# Patient Record
Sex: MALE | Race: BLACK OR AFRICAN AMERICAN
[De-identification: names, ages, dates, MRNs, and addresses within clinical notes are randomized per-mention and may not be internally consistent; named-entity substitution may affect disease eponyms.]

---

## 2017-04-06 ENCOUNTER — HOSPITAL ENCOUNTER (INPATIENT)
Dept: HOSPITAL 62 - ER | Age: 67
LOS: 5 days | Discharge: HOME | DRG: 101 | End: 2017-04-11
Attending: INTERNAL MEDICINE | Admitting: INTERNAL MEDICINE
Payer: MEDICARE

## 2017-04-06 DIAGNOSIS — F17.210: ICD-10-CM

## 2017-04-06 DIAGNOSIS — I10: ICD-10-CM

## 2017-04-06 DIAGNOSIS — E86.0: ICD-10-CM

## 2017-04-06 DIAGNOSIS — N17.9: ICD-10-CM

## 2017-04-06 DIAGNOSIS — E87.6: ICD-10-CM

## 2017-04-06 DIAGNOSIS — G40.409: Primary | ICD-10-CM

## 2017-04-06 DIAGNOSIS — R11.2: ICD-10-CM

## 2017-04-06 DIAGNOSIS — R06.6: ICD-10-CM

## 2017-04-06 DIAGNOSIS — E87.1: ICD-10-CM

## 2017-04-06 DIAGNOSIS — Z79.82: ICD-10-CM

## 2017-04-06 DIAGNOSIS — E78.00: ICD-10-CM

## 2017-04-06 DIAGNOSIS — Z79.899: ICD-10-CM

## 2017-04-06 LAB
ALBUMIN SERPL-MCNC: 4.3 G/DL (ref 3.5–5)
ALP SERPL-CCNC: 146 U/L (ref 38–126)
ALT SERPL-CCNC: 28 U/L (ref 21–72)
ANION GAP SERPL CALC-SCNC: 19 MMOL/L (ref 5–19)
APPEARANCE UR: CLEAR
AST SERPL-CCNC: 51 U/L (ref 17–59)
BASOPHILS # BLD AUTO: 0 10^3/UL (ref 0–0.2)
BASOPHILS NFR BLD AUTO: 0.2 % (ref 0–2)
BILIRUB DIRECT SERPL-MCNC: 0.2 MG/DL (ref 0–0.4)
BILIRUB SERPL-MCNC: 0.8 MG/DL (ref 0.2–1.3)
BILIRUB UR QL STRIP: NEGATIVE
BUN SERPL-MCNC: 38 MG/DL (ref 7–20)
CALCIUM: 8.5 MG/DL (ref 8.4–10.2)
CHLORIDE SERPL-SCNC: 75 MMOL/L (ref 98–107)
CO2 SERPL-SCNC: 22 MMOL/L (ref 22–30)
CREAT SERPL-MCNC: 2.63 MG/DL (ref 0.52–1.25)
EOSINOPHIL # BLD AUTO: 0 10^3/UL (ref 0–0.6)
EOSINOPHIL NFR BLD AUTO: 0 % (ref 0–6)
ERYTHROCYTE [DISTWIDTH] IN BLOOD BY AUTOMATED COUNT: 13.6 % (ref 11.5–14)
GLUCOSE SERPL-MCNC: 97 MG/DL (ref 75–110)
GLUCOSE UR STRIP-MCNC: NEGATIVE MG/DL
HCT VFR BLD CALC: 38.6 % (ref 37.9–51)
HGB BLD-MCNC: 12.8 G/DL (ref 13.5–17)
HGB HCT DIFFERENCE: -0.2
KETONES UR STRIP-MCNC: (no result) MG/DL
LYMPHOCYTES # BLD AUTO: 1.5 10^3/UL (ref 0.5–4.7)
LYMPHOCYTES NFR BLD AUTO: 9.9 % (ref 13–45)
MCH RBC QN AUTO: 27.6 PG (ref 27–33.4)
MCHC RBC AUTO-ENTMCNC: 33.1 G/DL (ref 32–36)
MCV RBC AUTO: 83 FL (ref 80–97)
MONOCYTES # BLD AUTO: 0.9 10^3/UL (ref 0.1–1.4)
MONOCYTES NFR BLD AUTO: 5.9 % (ref 3–13)
NEUTROPHILS # BLD AUTO: 12.5 10^3/UL (ref 1.7–8.2)
NEUTS SEG NFR BLD AUTO: 84 % (ref 42–78)
NITRITE UR QL STRIP: NEGATIVE
PH UR STRIP: 6 [PH] (ref 5–9)
POTASSIUM SERPL-SCNC: 3.1 MMOL/L (ref 3.6–5)
PROT SERPL-MCNC: 8 G/DL (ref 6.3–8.2)
PROT UR STRIP-MCNC: NEGATIVE MG/DL
RBC # BLD AUTO: 4.63 10^6/UL (ref 4.35–5.55)
SODIUM SERPL-SCNC: 117.7 MMOL/L (ref 137–145)
SP GR UR STRIP: 1
UROBILINOGEN UR-MCNC: NEGATIVE MG/DL (ref ?–2)
WBC # BLD AUTO: 14.9 10^3/UL (ref 4–10.5)

## 2017-04-06 PROCEDURE — 80184 ASSAY OF PHENOBARBITAL: CPT

## 2017-04-06 PROCEDURE — 81001 URINALYSIS AUTO W/SCOPE: CPT

## 2017-04-06 PROCEDURE — 80048 BASIC METABOLIC PNL TOTAL CA: CPT

## 2017-04-06 PROCEDURE — 96372 THER/PROPH/DIAG INJ SC/IM: CPT

## 2017-04-06 PROCEDURE — 83735 ASSAY OF MAGNESIUM: CPT

## 2017-04-06 PROCEDURE — 80185 ASSAY OF PHENYTOIN TOTAL: CPT

## 2017-04-06 PROCEDURE — 36415 COLL VENOUS BLD VENIPUNCTURE: CPT

## 2017-04-06 PROCEDURE — 71020: CPT

## 2017-04-06 PROCEDURE — 85025 COMPLETE CBC W/AUTO DIFF WBC: CPT

## 2017-04-06 PROCEDURE — 99291 CRITICAL CARE FIRST HOUR: CPT

## 2017-04-06 PROCEDURE — 96374 THER/PROPH/DIAG INJ IV PUSH: CPT

## 2017-04-06 PROCEDURE — 80053 COMPREHEN METABOLIC PANEL: CPT

## 2017-04-06 RX ADMIN — EXTENDED PHENYTOIN SODIUM SCH MG: 100 CAPSULE ORAL at 22:40

## 2017-04-06 RX ADMIN — SODIUM CHLORIDE PRN ML: 9 INJECTION, SOLUTION INTRAVENOUS at 19:50

## 2017-04-06 RX ADMIN — SIMVASTATIN SCH MG: 10 TABLET, FILM COATED ORAL at 22:40

## 2017-04-06 RX ADMIN — PHENOBARBITAL SCH MG: 32.4 TABLET ORAL at 19:49

## 2017-04-06 RX ADMIN — GABAPENTIN SCH MG: 300 CAPSULE ORAL at 22:40

## 2017-04-06 NOTE — PDOC H&P
History of Present Illness


Admission Date/PCP: 


  17 10:44





  RAPHAEL FREDIHERMINIOGIOAVNI





Patient complains of: Seizure, vomiting


History of Present Illness: 


ANGY GILBERT is a 66 year old male known to my practice who was brought 

to the ED by medic due spousal reported episode of seizure activity at home. It 

was reported as tonic-clonic type that lasted about 2 minutes and recurrent at 

home before his wife alerted EMS. He had another episode while been evaluated 

in the ED. Spouse reported episodes of nausea and vomiting for the past 3 days. 

She denied any recent alcohol usage or ingestion by the patient. Due to his 

nausea and vomiting maintenance on his anti seizure medication have been 

difficult. Spouse reported complain about abdominal pain and persistent 

hiccups. She denied associated diarrhea, fever, or chills. No reported chest 

pain, or difficulty with breathing. There rattling cough without significant 

sputum production. At the time of my evaluation patient has had IV Lorazepam, 

oral Dilantin and Phenobarbital administration, following command but not full 

at his preadmission state.





Past Medical History


Cardiac Medical History: Reports: Hyperlipidema, Hypertension


Neurological Medical History: Reports: Seizures


Psychiatric Medical History: 


   Denies: Depression





Social History


Smoking Status: Former Smoker


Number of Years Smokin


Last Time Smoked: 4 yrs ago


Frequency of Alcohol Use: None


Hx Recreational Drug Use: No


Drugs: None


Hx Prescription Drug Abuse: No





- Advance Directive


Resuscitation Status: Full Code





Family History


Family History: Reviewed & Not Pertinent


Parental Family History Reviewed: Yes


Children Family History Reviewed: Yes


Sibling(s) Family History Reviewed.: Yes





Medication/Allergy


Home Medications: 








Aspirin [Ecotrin 81 mg EC Tablet] 81 mg PO DAILY 17 


Gabapentin [Neurontin 300 mg Capsule] 300 mg PO Q8 17 


Lisinopril [Prinivil 10 mg Tablet] 10 mg PO DAILY 17 


Phenobarbital [Phenobarbital 32.4 Mg Tablet] 32.4 mg PO BID 17 


Phenytoin Sodium Extended [Dilantin 100 mg Capsule.er] 100 mg PO QAM 17 


Phenytoin Sodium Extended [Dilantin 100 mg Capsule.er] 100 mg PO QHS 17 


Phenytoin Sodium Extended [Dilantin 100 mg Capsule.er] 200 mg PO DAILY@1400  


Simvastatin [Zocor 10 mg Tablet] 10 mg PO QHS 17 


Simvastatin [Zocor 20 mg Tablet] 20 mg PO DAILY 17 








Allergies/Adverse Reactions: 


 





No Known Allergies Allergy (Verified 10/14/14 09:35)


 











Review of Systems


Constitutional: ABSENT: chills, fever(s), headache(s), weight gain, weight loss


Eyes: ABSENT: visual disturbances


Ears: ABSENT: hearing changes


Nose, Mouth, and Throat: ABSENT: as per HPI, headache(s), mouth pain, sore 

throat, vertigo, other


Cardiovascular: ABSENT: chest pain, dyspnea on exertion, edema, orthropnea, 

palpitations


Respiratory: ABSENT: cough, hemoptysis


Gastrointestinal: PRESENT: abdominal pain, nausea, vomiting.  ABSENT: as per HPI

, bloating, coffee ground emesis, constipation, diarrhea, dysphagia, heartburn, 

hematemesis, hematochezia, melena, other


Genitourinary: ABSENT: dysuria, hematuria


Musculoskeletal: ABSENT: joint swelling


Integumentary: ABSENT: rash, wounds


Neurological: PRESENT: confusion - post ictal, convulsions


Psychiatric: ABSENT: anxiety, depression, homidical ideation, suicidal ideation


Endocrine: ABSENT: cold intolerance, heat intolerance, polydipsia, polyuria


Hematologic/Lymphatic: ABSENT: easy bleeding, easy bruising, lymphadenopathy


Allergic/Immunologic: PRESENT: seasonal rhinorrhea





Physical Exam


Vital Signs: 


 











Temp Pulse Resp BP Pulse Ox


 


 98.5 F   85   19   170/99 H  98 


 


 17 16:52  17 16:52  17 16:52  17 16:52  17 17:51








 Intake & Output











 17





 06:59 06:59 06:59


 


Weight   77.8 kg











General appearance: PRESENT: no acute distress, cooperative


Head exam: PRESENT: atraumatic, normocephalic


Eye exam: PRESENT: conjunctiva pink, EOMI, PERRLA.  ABSENT: scleral icterus


Ear exam: PRESENT: normal external ear exam


Mouth exam: PRESENT: moist, tongue midline


Teeth exam: ABSENT: dental caries, dental tenderness, edentulous, poor dentation

, other


Throat exam: ABSENT: post pharyngeal erythema, tonsillar erythema, tonsillar 

exudate, tonsillogmegaly, other


Neck exam: PRESENT: full ROM.  ABSENT: carotid bruit, JVD, lymphadenopathy, 

thyromegaly


Respiratory exam: PRESENT: clear to auscultation sanrdo, crackles - scattered 

bilaterally, decreased breath sounds


Cardiovascular exam: PRESENT: RRR.  ABSENT: diastolic murmur, rubs, systolic 

murmur


Pulses: PRESENT: normal dorsalis pedis pul, +2 pedal pulses bilateral


Vascular exam: PRESENT: normal capillary refill


GI/Abdominal exam: PRESENT: normal bowel sounds, soft.  ABSENT: distended, 

guarding, mass, organolmegaly, rebound, tenderness


Rectal exam: PRESENT: deferred


Extremities exam: PRESENT: full ROM


Musculoskeletal exam: PRESENT: deformity - related to joint involvement with 

arthritis


Neurological exam: PRESENT: alert, awake, oriented to person, reflexes normal, 

CN II-XII grossly intact.  ABSENT: altered, oriented to place, oriented to time

, oriented to situation, abnormal gait, ataxia, motor sensory deficit, normal 

gait, aphasic, other


Psychiatric exam: PRESENT: appropriate affect, normal mood.  ABSENT: homicidal 

ideation, suicidal ideation


Skin exam: PRESENT: dry, intact, warm.  ABSENT: cyanosis, rash





Results


Laboratory Results: 


See [employer] for laboratory information. These were reviewed and form 

significant part of my medial decision making.





Assessment & Plan





- Diagnosis


(1) Dehydration with hyponatremia


Is this a current diagnosis for this admission?: YesPlan: 


See admitting physician orders.








(2) Intractable nausea and vomiting


Qualifiers: 


     Vomiting type: unspecified     Qualified Code(s): R11.2 - Nausea with 

vomiting, unspecified  


Is this a current diagnosis for this admission?: YesPlan: 


See admitting physician orders.











(4) Intractable hiccups


Is this a current diagnosis for this admission?: YesPlan: 


See admitting physician orders.








(5) HLD (hyperlipidemia)


Qualifiers: 


     Hyperlipidemia type: pure hypercholesterolemia        Qualified Code(s): 

E78.00 - Pure hypercholesterolemia, unspecified; E78.0 - Pure 

hypercholesterolemia  


Is this a current diagnosis for this admission?: YesPlan: 


See admitting physician orders.








(6) HTN (hypertension)


Qualifiers: 


     Hypertension type: essential hypertension        Qualified Code(s): I10 - 

Essential (primary) hypertension  


Is this a current diagnosis for this admission?: YesPlan: 


See admitting physician orders.








(7) Hypopotassemia


Is this a current diagnosis for this admission?: YesPlan: 


See admitting physician orders.











- Time


Time Spent: 50 to 70 Minutes


Medications reviewed and adjusted accordingly: Yes


Anticipated discharge: Home


Within: Other





- Inpatient Certification


Medical Necessity: Need Close Monitoring Due to Risk of Patient Decompensation, 

Need For IV Fluids, Need For Continuous Telemetry Monitoring, Risk of 

Complication if Not Cared For in Hospital


Post Hospital Care: D/C Planner Documentation





- Plan Summary


Plan Summary: 


See admitting physician orders.

## 2017-04-06 NOTE — ER DOCUMENT REPORT
ED General





- General


Stated Complaint: POSSIBLE SEIZURE


Mode of Arrival: Medic


Information source: Patient


Notes: 


66-year-old male with history of seizure disorder after a tumor was removed was 

on Dilantin and phenobarbital presents with EMS with concerns of seizure.  It 

is noted that the patient has been vomiting for past 2 days has not been 

holding his medications down.  Wife denies any other complaints denies any 

fevers or chills denies any abdominal pain.  Patient did have a 2 minute 

seizure at home, on arrival here patient did have a another  focal seizure eyes 

were to the left, unresponsive


TRAVEL OUTSIDE OF THE [place] IN LAST 30 DAYS: No





- HPI


Onset: Just prior to arrival


Onset/Duration: Sudden


Quality of pain: No pain


Severity: Mild


Pain Level: Denies


Associated symptoms: Nausea, Vomiting, Other


Exacerbated by: Denies


Relieved by: Denies


Similar symptoms previously: Yes


Recently seen / treated by doctor: Yes





- Related Data


Allergies/Adverse Reactions: 


 





No Known Allergies Allergy (Verified 10/14/14 09:35)


 











Past Medical History





- Social History


Smoking Status: Never Smoker


Cigarette use (# per day): No


Chew tobacco use (# tins/day): No


Smoking Education Provided: No


Family History: Reviewed & Not Pertinent





- Past Medical History


Cardiac Medical History: Reports: Hx Hypercholesterolemia, Hx Hypertension


Neurological Medical History: Reports: Hx Seizures


Psychiatric Medical History: 


   Denies: Hx Depression


Past Surgical History: Reports: Hx Neurologic Surgery - Brain tumor removal





- Immunizations


Hx Diphtheria, Pertussis, Tetanus Vaccination: Yes


Hx Pneumococcal Vaccination: 10/16/14





Review of Systems





- Review of Systems


Notes: 


REVIEW OF SYSTEMS:


CONSTITUTIONAL :  Denies fever,  chills, or sweats.  Denies recent illness.


EENT:   Denies eye, ear, throat, or mouth pain or symptoms.  Denies nasal or 

sinus congestion or discharge.  Denies throat, tongue, or mouth swelling or 

difficulty swallowing.


CARDIOVASCULAR:  Denies chest pain.  Denies palpitations or racing or irregular 

heart beat.  Denies ankle edema.


RESPIRATORY:  Denies cough, cold, or chest congestion.  Denies shortness of 

breath, difficulty breathing, or wheezing.


GASTROINTESTINAL: Nausea vomiting


GENITOURINARY:  Denies difficulty urinating, painful urination, burning, 

frequency, blood in urine, or discharge.


MUSCULOSKELETAL:  Denies back or neck pain or stiffness.  Denies joint pain or 

swelling.


SKIN:   Denies rash, lesions or sores.


HEMATOLOGIC :   Denies easy bruising or bleeding.


LYMPHATIC:  Denies swollen, enlarged glands.


NEUROLOGICAL:  Denies confusion or altered mental status.  Denies passing out 

or loss of consciousness.  Denies dizziness or lightheadedness.  Denies 

headache.  Denies weakness or paralysis or loss of use of either side.  Denies 

problems with gait or speech.  Denies sensory loss, numbness, or tingling.  

Denies seizures.


PSYCHIATRIC:  Denies anxiety or stress.  Denies depression, suicidal ideation, 

or homicidal ideation.





ALL OTHER SYSTEMS REVIEWED AND NEGATIVE.





Dictation was performed using Dragon voice recognition software 








PHYSICAL EXAMINATION:





GENERAL: Well-appearing, well-nourished and in no acute distress.





HEAD: Atraumatic, normocephalic.





EYES: Pupils equal round and reactive to light, extraocular movements intact, 

sclera anicteric, conjunctiva are normal.





ENT: Nares patent, oropharynx clear without exudates.  Moist mucous membranes.





NECK: Normal range of motion, supple without lymphadenopathy





LUNGS: Breath sounds clear to auscultation bilaterally and equal.  No wheezes 

rales or rhonchi.





HEART: Regular rate and rhythm without murmurs





ABDOMEN: Soft, nontender, nondistended abdomen.  No guarding, no rebound.  No 

masses appreciated.





Musculoskeletal: Normal range of motion, no pitting or edema.  No cyanosis.





NEUROLOGICAL: Patient seizing, resolved with Ativan.  Patient postictal





PSYCH: Normal mood, normal affect.





SKIN: Warm, Dry, normal turgor, no rashes or lesions noted.





Course





- Re-evaluation


Re-evalutation: 


04/06/17 08:48


I believe the patient's seizure disorder has been worsened with his vomiting 

and inability to keep down his medications at this time.  He has labs ordered 

at this time





04/06/17 10:28


it appears patient is quite hyponatremic, in acute renal fialure. IV fluids 

ordered, will admit ot his pcp 





04/06/17 10:31


Spelled with Dr. Zacarias who will accept to the [facility], defers on 3% at this 

time 





- Laboratory


Result Diagrams: 


 04/06/17 09:37





 04/06/17 09:37


Laboratory results interpreted by me: 


 











  04/06/17 04/06/17 04/06/17





  09:37 09:37 09:37


 


WBC  14.9 H  


 


Hgb  12.8 L  


 


Seg Neutrophils %  84.0 H  


 


Lymphocytes %  9.9 L  


 


Absolute Neutrophils  12.5 H  


 


Sodium   117.7 L* 


 


Potassium   3.1 L 


 


Chloride   75 L 


 


BUN   38 H 


 


Creatinine   2.63 H 


 


Est GFR ( Amer)   30 L 


 


Est GFR (Non-Af Amer)   24 L 


 


Alkaline Phosphatase   146 H 


 


Phenytoin    5.5 L














Critical Care Note





- Critical Care Note


Total time excluding time spent on procedures (mins): 38


Comments: 


38  minutes of critical care time spent in direct contact evaluating and 

reevaluating the patient, treating symptoms, reviewing labs and studies and 

speaking with family  and consultants excluding any procedures





Discharge





- Discharge


Clinical Impression: 


 Dehydration with hyponatremia, Renal insufficiency, Seizure





Intractable nausea and vomiting


Qualifiers:


 Vomiting type: unspecified Qualified Code(s): R11.2 - Nausea with vomiting, 

unspecified





Admitting Provider: Adi


Unit Admitted: [facility]MC[facility]


Referrals: 


RAPHAEL ZACARIAS MD [Primary Care Provider] - Follow up as needed

## 2017-04-07 LAB
ALBUMIN SERPL-MCNC: 4.5 G/DL (ref 3.5–5)
ALP SERPL-CCNC: 153 U/L (ref 38–126)
ALT SERPL-CCNC: 31 U/L (ref 21–72)
ANION GAP SERPL CALC-SCNC: 14 MMOL/L (ref 5–19)
AST SERPL-CCNC: 52 U/L (ref 17–59)
BASOPHILS # BLD AUTO: 0 10^3/UL (ref 0–0.2)
BASOPHILS NFR BLD AUTO: 0.1 % (ref 0–2)
BILIRUB DIRECT SERPL-MCNC: 0.4 MG/DL (ref 0–0.4)
BILIRUB SERPL-MCNC: 1.2 MG/DL (ref 0.2–1.3)
BUN SERPL-MCNC: 27 MG/DL (ref 7–20)
CALCIUM: 9.1 MG/DL (ref 8.4–10.2)
CHLORIDE SERPL-SCNC: 91 MMOL/L (ref 98–107)
CO2 SERPL-SCNC: 29 MMOL/L (ref 22–30)
CREAT SERPL-MCNC: 1.45 MG/DL (ref 0.52–1.25)
EOSINOPHIL # BLD AUTO: 0 10^3/UL (ref 0–0.6)
EOSINOPHIL NFR BLD AUTO: 0 % (ref 0–6)
ERYTHROCYTE [DISTWIDTH] IN BLOOD BY AUTOMATED COUNT: 13.8 % (ref 11.5–14)
GLUCOSE SERPL-MCNC: 102 MG/DL (ref 75–110)
HCT VFR BLD CALC: 44 % (ref 37.9–51)
HGB BLD-MCNC: 14.2 G/DL (ref 13.5–17)
HGB HCT DIFFERENCE: -1.4
LYMPHOCYTES # BLD AUTO: 1.6 10^3/UL (ref 0.5–4.7)
LYMPHOCYTES NFR BLD AUTO: 22 % (ref 13–45)
MCH RBC QN AUTO: 27.3 PG (ref 27–33.4)
MCHC RBC AUTO-ENTMCNC: 32.2 G/DL (ref 32–36)
MCV RBC AUTO: 85 FL (ref 80–97)
MONOCYTES # BLD AUTO: 0.8 10^3/UL (ref 0.1–1.4)
MONOCYTES NFR BLD AUTO: 10.1 % (ref 3–13)
NEUTROPHILS # BLD AUTO: 5 10^3/UL (ref 1.7–8.2)
NEUTS SEG NFR BLD AUTO: 67.8 % (ref 42–78)
POTASSIUM SERPL-SCNC: 3.1 MMOL/L (ref 3.6–5)
PROT SERPL-MCNC: 8.8 G/DL (ref 6.3–8.2)
RBC # BLD AUTO: 5.18 10^6/UL (ref 4.35–5.55)
SODIUM SERPL-SCNC: 133.8 MMOL/L (ref 137–145)
WBC # BLD AUTO: 7.5 10^3/UL (ref 4–10.5)

## 2017-04-07 RX ADMIN — EXTENDED PHENYTOIN SODIUM SCH MG: 100 CAPSULE ORAL at 21:38

## 2017-04-07 RX ADMIN — EXTENDED PHENYTOIN SODIUM SCH MG: 100 CAPSULE ORAL at 13:06

## 2017-04-07 RX ADMIN — POTASSIUM CHLORIDE SCH MEQ: 750 TABLET, FILM COATED, EXTENDED RELEASE ORAL at 11:09

## 2017-04-07 RX ADMIN — PHENOBARBITAL SCH MG: 32.4 TABLET ORAL at 19:01

## 2017-04-07 RX ADMIN — GABAPENTIN SCH MG: 300 CAPSULE ORAL at 13:06

## 2017-04-07 RX ADMIN — POTASSIUM CHLORIDE SCH MEQ: 750 TABLET, FILM COATED, EXTENDED RELEASE ORAL at 13:05

## 2017-04-07 RX ADMIN — GABAPENTIN SCH MG: 300 CAPSULE ORAL at 21:38

## 2017-04-07 RX ADMIN — SODIUM CHLORIDE PRN ML: 9 INJECTION, SOLUTION INTRAVENOUS at 05:21

## 2017-04-07 RX ADMIN — SIMVASTATIN SCH MG: 10 TABLET, FILM COATED ORAL at 21:38

## 2017-04-07 RX ADMIN — GABAPENTIN SCH MG: 300 CAPSULE ORAL at 05:21

## 2017-04-07 RX ADMIN — PHENOBARBITAL SCH MG: 32.4 TABLET ORAL at 09:16

## 2017-04-07 RX ADMIN — LISINOPRIL SCH MG: 10 TABLET ORAL at 09:16

## 2017-04-07 RX ADMIN — LANSOPRAZOLE SCH MG: 30 TABLET, ORALLY DISINTEGRATING, DELAYED RELEASE ORAL at 05:21

## 2017-04-07 RX ADMIN — SODIUM CHLORIDE PRN ML: 9 INJECTION, SOLUTION INTRAVENOUS at 15:24

## 2017-04-07 RX ADMIN — EXTENDED PHENYTOIN SODIUM SCH MG: 100 CAPSULE ORAL at 09:17

## 2017-04-07 RX ADMIN — ENOXAPARIN SODIUM SCH MG: 40 INJECTION SUBCUTANEOUS at 09:16

## 2017-04-07 NOTE — PDOC PROGRESS REPORT
Subjective


Progress Note for:: 04/07/17


Subjective:: 


Patient is more lucid and able to contribute to his medical history at the time 

of my assessment this afternoon but assumed admission due to his persistent 

hiccups. Denied any nausea or vomiting. No abdominal pain. His hiccups do 

persist. No chest pain or difficulty with breathing.





Physical Exam


Vital Signs: 


 











Temp Pulse Resp BP Pulse Ox


 


 99.8 F   83   27 H  151/73 H  98 


 


 04/07/17 11:46  04/07/17 14:00  04/07/17 11:46  04/07/17 11:46  04/07/17 11:46








 Intake & Output











 04/06/17 04/07/17 04/08/17





 06:59 06:59 06:59


 


Intake Total  1288 710


 


Output Total  800 1000


 


Balance  488 -290











General appearance: PRESENT: no acute distress, cooperative


Head exam: PRESENT: atraumatic, normocephalic


Eye exam: PRESENT: conjunctiva pink, EOMI, PERRLA.  ABSENT: scleral icterus


Mouth exam: PRESENT: moist


Respiratory exam: PRESENT: clear to auscultation sandro


Cardiovascular exam: PRESENT: RRR.  ABSENT: diastolic murmur, rubs, systolic 

murmur


GI/Abdominal exam: PRESENT: normal bowel sounds, soft.  ABSENT: distended, 

guarding, mass, organolmegaly, rebound, tenderness


Extremities exam: PRESENT: full ROM


Musculoskeletal exam: PRESENT: deformity - related to joint involvement with 

arthritis


Neurological exam: PRESENT: alert, awake, oriented to person, oriented to place

, oriented to time, oriented to situation, CN II-XII grossly intact, other - 

there is persistent hiccups throughout the duration of my visit..  ABSENT: 

motor sensory deficit


Psychiatric exam: PRESENT: appropriate affect, normal mood.  ABSENT: homicidal 

ideation, suicidal ideation


Skin exam: PRESENT: dry, intact, warm.  ABSENT: cyanosis, rash





Results


Laboratory Results: 


 





 04/07/17 04:51 





 04/07/17 04:51 





 











  04/06/17 04/07/17 04/07/17





  23:15 04:51 04:51


 


WBC   7.5 


 


RBC   5.18 


 


Hgb   14.2 


 


Hct   44.0 


 


MCV   85 


 


MCH   27.3 


 


MCHC   32.2 


 


RDW   13.8 


 


Plt Count   216 


 


Seg Neutrophils %   67.8 


 


Lymphocytes %   22.0 


 


Monocytes %   10.1 


 


Eosinophils %   0.0 


 


Basophils %   0.1 


 


Absolute Neutrophils   5.0 


 


Absolute Lymphocytes   1.6 


 


Absolute Monocytes   0.8 


 


Absolute Eosinophils   0.0 


 


Absolute Basophils   0.0 


 


Sodium    133.8 L


 


Potassium    3.1 L


 


Chloride    91 L


 


Carbon Dioxide    29


 


Anion Gap    14


 


BUN    27 H


 


Creatinine    1.45 H


 


Est GFR ( Amer)    59 L


 


Est GFR (Non-Af Amer)    49 L


 


Glucose    102


 


Calcium    9.1


 


Magnesium   


 


Total Bilirubin    1.2


 


AST    52


 


ALT    31


 


Alkaline Phosphatase    153 H


 


Total Protein    8.8 H


 


Albumin    4.5


 


Urine Color  STRAW  


 


Urine Appearance  CLEAR  


 


Urine pH  6.0  


 


Ur Specific Gravity  1.003  


 


Urine Protein  NEGATIVE  


 


Urine Glucose (UA)  NEGATIVE  


 


Urine Ketones  TRACE H  


 


Urine Blood  MODERATE H  


 


Urine Nitrite  NEGATIVE  


 


Ur Leukocyte Esterase  NEGATIVE  


 


Urine WBC (Auto)  1  


 


Urine RBC (Auto)  0  














  04/07/17





  04:51


 


WBC 


 


RBC 


 


Hgb 


 


Hct 


 


MCV 


 


MCH 


 


MCHC 


 


RDW 


 


Plt Count 


 


Seg Neutrophils % 


 


Lymphocytes % 


 


Monocytes % 


 


Eosinophils % 


 


Basophils % 


 


Absolute Neutrophils 


 


Absolute Lymphocytes 


 


Absolute Monocytes 


 


Absolute Eosinophils 


 


Absolute Basophils 


 


Sodium 


 


Potassium 


 


Chloride 


 


Carbon Dioxide 


 


Anion Gap 


 


BUN 


 


Creatinine 


 


Est GFR ( Amer) 


 


Est GFR (Non-Af Amer) 


 


Glucose 


 


Calcium 


 


Magnesium  2.3


 


Total Bilirubin 


 


AST 


 


ALT 


 


Alkaline Phosphatase 


 


Total Protein 


 


Albumin 


 


Urine Color 


 


Urine Appearance 


 


Urine pH 


 


Ur Specific Gravity 


 


Urine Protein 


 


Urine Glucose (UA) 


 


Urine Ketones 


 


Urine Blood 


 


Urine Nitrite 


 


Ur Leukocyte Esterase 


 


Urine WBC (Auto) 


 


Urine RBC (Auto) 











Impressions: 


 





Chest X-Ray  04/06/17 00:00


IMPRESSION:  NO SIGNIFICANT RADIOGRAPHIC FINDING IN THE CHEST.


 














Assessment & Plan





- Diagnosis


(1) Dehydration with hyponatremia


Is this a current diagnosis for this admission?: YesPlan: 


Continue IV Normal saline infusion. Repeat his BMP as needed.








(2) Intractable nausea and vomiting


Qualifiers: 


     Vomiting type: unspecified     Qualified Code(s): R11.2 - Nausea with 

vomiting, unspecified  


Is this a current diagnosis for this admission?: YesPlan: 





See attending physician orders.








(3) Seizure


Is this a current diagnosis for this admission?: YesPlan: 


See attending physician orders.








(4) Intractable hiccups


Is this a current diagnosis for this admission?: YesPlan: 





See attending physician orders.








(5) HLD (hyperlipidemia)


Qualifiers: 


     Hyperlipidemia type: pure hypercholesterolemia        Qualified Code(s): 

E78.00 - Pure hypercholesterolemia, unspecified; E78.0 - Pure 

hypercholesterolemia  


Is this a current diagnosis for this admission?: YesPlan: 


See attending physician orders.








(6) HTN (hypertension)


Qualifiers: 


     Hypertension type: essential hypertension        Qualified Code(s): I10 - 

Essential (primary) hypertension  


Is this a current diagnosis for this admission?: YesPlan: 


See attending physician orders.








(7) Hypopotassemia


Is this a current diagnosis for this admission?: YesPlan: 





See attending physician orders. Patient received oral supplementation of 

potassium chloride for his persistent hypokalemia. 











- Time


Time Spent with patient: 25-34 minutes


Medications reviewed and adjusted accordingly: Yes


Anticipated discharge: Home with Homehealth


Within: Other





- Inpatient Certification


Medical Necessity: Need Close Monitoring Due to Risk of Patient Decompensation, 

Need For IV Fluids, Risk of Complication if Not Cared For in Hospital


Post Hospital Care: D/C Planner Documentation





- Plan Summary


Plan Summary: 


See attending physician orders.

## 2017-04-08 LAB
ANION GAP SERPL CALC-SCNC: 18 MMOL/L (ref 5–19)
BUN SERPL-MCNC: 20 MG/DL (ref 7–20)
CALCIUM: 9.1 MG/DL (ref 8.4–10.2)
CHLORIDE SERPL-SCNC: 100 MMOL/L (ref 98–107)
CO2 SERPL-SCNC: 22 MMOL/L (ref 22–30)
CREAT SERPL-MCNC: 1.01 MG/DL (ref 0.52–1.25)
GLUCOSE SERPL-MCNC: 72 MG/DL (ref 75–110)
POTASSIUM SERPL-SCNC: 3.9 MMOL/L (ref 3.6–5)
SODIUM SERPL-SCNC: 139.7 MMOL/L (ref 137–145)

## 2017-04-08 RX ADMIN — GABAPENTIN SCH MG: 300 CAPSULE ORAL at 05:51

## 2017-04-08 RX ADMIN — LISINOPRIL SCH MG: 10 TABLET ORAL at 10:09

## 2017-04-08 RX ADMIN — EXTENDED PHENYTOIN SODIUM SCH MG: 100 CAPSULE ORAL at 14:08

## 2017-04-08 RX ADMIN — EXTENDED PHENYTOIN SODIUM SCH MG: 100 CAPSULE ORAL at 21:06

## 2017-04-08 RX ADMIN — ENOXAPARIN SODIUM SCH MG: 40 INJECTION SUBCUTANEOUS at 08:35

## 2017-04-08 RX ADMIN — PHENOBARBITAL SCH MG: 32.4 TABLET ORAL at 17:22

## 2017-04-08 RX ADMIN — LANSOPRAZOLE SCH MG: 30 TABLET, ORALLY DISINTEGRATING, DELAYED RELEASE ORAL at 05:51

## 2017-04-08 RX ADMIN — EXTENDED PHENYTOIN SODIUM SCH MG: 100 CAPSULE ORAL at 08:35

## 2017-04-08 RX ADMIN — PHENOBARBITAL SCH MG: 32.4 TABLET ORAL at 10:09

## 2017-04-08 RX ADMIN — SODIUM CHLORIDE PRN ML: 9 INJECTION, SOLUTION INTRAVENOUS at 14:12

## 2017-04-08 RX ADMIN — SIMVASTATIN SCH MG: 10 TABLET, FILM COATED ORAL at 21:05

## 2017-04-08 RX ADMIN — GABAPENTIN SCH MG: 300 CAPSULE ORAL at 21:05

## 2017-04-08 RX ADMIN — SODIUM CHLORIDE PRN ML: 9 INJECTION, SOLUTION INTRAVENOUS at 03:09

## 2017-04-08 NOTE — PDOC PROGRESS REPORT
Subjective


Progress Note for:: 04/08/17


Subjective:: 


Patient's hiccups spells do continue but less intense. No chest pain or 

difficulty with breathing. No nausea, vomiting, or abdominal pain. No fever or 

chills. Less coughing and chest congestion.





Physical Exam


Vital Signs: 


 











Temp Pulse Resp BP Pulse Ox


 


 98.7 F   76   18   177/91 H  97 


 


 04/08/17 08:00  04/08/17 08:00  04/08/17 08:00  04/08/17 08:00  04/08/17 08:00








 Intake & Output











 04/07/17 04/08/17 04/09/17





 06:59 06:59 06:59


 


Intake Total 1288 4016 355


 


Output Total 800 1700 300


 


Balance 488 2316 55


 


Weight  77.6 kg 











Physical Exam: 


General appearance: PRESENT: no acute distress, cooperative


Head exam: PRESENT: atraumatic, normocephalic


Eye exam: PRESENT: conjunctiva pink, EOMI, PERRLA.  ABSENT: scleral icterus


Mouth exam: PRESENT: moist


Respiratory exam: PRESENT: clear to auscultation sandro


Cardiovascular exam: PRESENT: RRR.  ABSENT: diastolic murmur, rubs, systolic 

murmur


GI/Abdominal exam: PRESENT: normal bowel sounds, soft.  ABSENT: distended, 

guarding, mass, organomegaly, rebound, tenderness


Extremities exam: PRESENT: full ROM


Musculoskeletal exam: PRESENT: deformity - related to joint involvement with 

arthritis


Neurological exam: PRESENT: alert, awake, oriented to person, oriented to place

, oriented to time, oriented to situation, CN II-XII grossly intact, other - 

there are episodes of hiccups duration of my visit..  ABSENT: motor sensory 

deficit


Psychiatric exam: PRESENT: appropriate affect, normal mood.  ABSENT: homicidal 

ideation, suicidal ideation


Skin exam: PRESENT: dry, intact, warm.  ABSENT: cyanosis, rash





Results


Laboratory Results: 


 





 04/07/17 04:51 





 04/08/17 12:07 





 











  04/08/17





  12:07


 


Sodium  139.7


 


Potassium  3.9


 


Chloride  100


 


Carbon Dioxide  22


 


Anion Gap  18


 


BUN  20


 


Creatinine  1.01


 


Est GFR ( Amer)  > 60


 


Est GFR (Non-Af Amer)  > 60


 


Glucose  72 L


 


Calcium  9.1











Impressions: 


 





Chest X-Ray  04/06/17 00:00


IMPRESSION:  NO SIGNIFICANT RADIOGRAPHIC FINDING IN THE CHEST.


 














Assessment & Plan





- Diagnosis


(1) Dehydration with hyponatremia


Is this a current diagnosis for this admission?: YesPlan: 





Continue IV Normal saline infusion. See attending physician orders.








(2) Intractable nausea and vomiting


Qualifiers: 


     Vomiting type: unspecified     Qualified Code(s): R11.2 - Nausea with 

vomiting, unspecified  


Is this a current diagnosis for this admission?: YesPlan: 





See attending physician orders.








(3) Seizure


Is this a current diagnosis for this admission?: YesPlan: 


See attending physician orders.








(4) Intractable hiccups


Is this a current diagnosis for this admission?: YesPlan: 








See attending physician orders. I will increase Gabapentin to 600 mg p.o q8hrs.








(5) HLD (hyperlipidemia)


Qualifiers: 


     Hyperlipidemia type: pure hypercholesterolemia        Qualified Code(s): 

E78.00 - Pure hypercholesterolemia, unspecified; E78.0 - Pure 

hypercholesterolemia  


Is this a current diagnosis for this admission?: YesPlan: 


See attending physician orders.








(6) HTN (hypertension)


Qualifiers: 


     Hypertension type: essential hypertension        Qualified Code(s): I10 - 

Essential (primary) hypertension  


Is this a current diagnosis for this admission?: YesPlan: 


See attending physician orders.








(7) Hypopotassemia


Is this a current diagnosis for this admission?: YesPlan: 








See attending physician orders. Resolved.











- Time


Time Spent with patient: 25-34 minutes


Medications reviewed and adjusted accordingly: Yes


Anticipated discharge: Home


Within: Other





- Inpatient Certification


Medical Necessity: Need Close Monitoring Due to Risk of Patient Decompensation, 

Need For IV Fluids, Need For Continuous Telemetry Monitoring, Risk of 

Complication if Not Cared For in Hospital


Post Hospital Care: D/C Planner Documentation





- Plan Summary


Plan Summary: 


See attending physician orders.

## 2017-04-09 RX ADMIN — EXTENDED PHENYTOIN SODIUM SCH MG: 100 CAPSULE ORAL at 21:14

## 2017-04-09 RX ADMIN — PHENOBARBITAL SCH MG: 32.4 TABLET ORAL at 17:45

## 2017-04-09 RX ADMIN — GABAPENTIN SCH MG: 300 CAPSULE ORAL at 06:28

## 2017-04-09 RX ADMIN — GABAPENTIN SCH MG: 300 CAPSULE ORAL at 21:14

## 2017-04-09 RX ADMIN — SODIUM CHLORIDE PRN ML: 9 INJECTION, SOLUTION INTRAVENOUS at 11:55

## 2017-04-09 RX ADMIN — SIMVASTATIN SCH MG: 10 TABLET, FILM COATED ORAL at 21:14

## 2017-04-09 RX ADMIN — GABAPENTIN SCH MG: 300 CAPSULE ORAL at 16:41

## 2017-04-09 RX ADMIN — LANSOPRAZOLE SCH MG: 30 TABLET, ORALLY DISINTEGRATING, DELAYED RELEASE ORAL at 06:29

## 2017-04-09 RX ADMIN — EXTENDED PHENYTOIN SODIUM SCH MG: 100 CAPSULE ORAL at 08:43

## 2017-04-09 RX ADMIN — EXTENDED PHENYTOIN SODIUM SCH MG: 100 CAPSULE ORAL at 16:41

## 2017-04-09 RX ADMIN — ENOXAPARIN SODIUM SCH MG: 40 INJECTION SUBCUTANEOUS at 08:43

## 2017-04-09 RX ADMIN — PHENOBARBITAL SCH MG: 32.4 TABLET ORAL at 09:31

## 2017-04-09 RX ADMIN — LISINOPRIL SCH MG: 10 TABLET ORAL at 09:31

## 2017-04-09 NOTE — PDOC PROGRESS REPORT
Subjective


Progress Note for:: 04/09/17


Subjective:: 


Patient's hiccups spells is improving with adjustment of his Gabapentin dosage. 

No seizure activity since last clinical evaluation. No chest pain or difficulty 

with breathing. No nausea, vomiting, or abdominal pain. No fever or chills. 

Less coughing and chest congestion.





Physical Exam


Vital Signs: 


 











Temp Pulse Resp BP Pulse Ox


 


 98.7 F   71   18   143/73 H  98 


 


 04/09/17 07:14  04/09/17 07:14  04/09/17 07:14  04/09/17 07:14  04/09/17 07:14








 Intake & Output











 04/08/17 04/09/17 04/10/17





 06:59 06:59 06:59


 


Intake Total 4016 4882 


 


Output Total 1700 2350 


 


Balance 2316 2532 


 


Weight 77.6 kg 73.6 kg 











Physical Exam: 


General appearance: PRESENT: no acute distress, cooperative


Head exam: PRESENT: atraumatic, normocephalic


Eye exam: PRESENT: conjunctiva pink, EOMI, PERRLA.  ABSENT: scleral icterus


Mouth exam: PRESENT: moist


Respiratory exam: PRESENT: clear to auscultation sandro


Cardiovascular exam: PRESENT: RRR.  ABSENT: diastolic murmur, rubs, systolic 

murmur


GI/Abdominal exam: PRESENT: normal bowel sounds, soft.  ABSENT: distended, 

guarding, mass, organomegaly, rebound, tenderness


Extremities exam: PRESENT: full ROM


Musculoskeletal exam: PRESENT: deformity - related to joint involvement with 

arthritis


Neurological exam: PRESENT: alert, awake, oriented to person, oriented to place

, oriented to time, oriented to situation, CN II-XII grossly intact, other - 

there are episodes of hiccups duration of my visit..  ABSENT: motor sensory 

deficit


Psychiatric exam: PRESENT: appropriate affect, normal mood.  ABSENT: homicidal 

ideation, suicidal ideation


Skin exam: PRESENT: dry, intact, warm.  ABSENT: cyanosis, rash





Results


Laboratory Results: 


 





 04/07/17 04:51 





 04/08/17 12:07 





 











  04/08/17





  12:07


 


Sodium  139.7


 


Potassium  3.9


 


Chloride  100


 


Carbon Dioxide  22


 


Anion Gap  18


 


BUN  20


 


Creatinine  1.01


 


Est GFR ( Amer)  > 60


 


Est GFR (Non-Af Amer)  > 60


 


Glucose  72 L


 


Calcium  9.1











Impressions: 


 





Chest X-Ray  04/06/17 00:00


IMPRESSION:  NO SIGNIFICANT RADIOGRAPHIC FINDING IN THE CHEST.


 














Assessment & Plan





- Diagnosis


(1) Dehydration with hyponatremia


Is this a current diagnosis for this admission?: YesPlan: 








Resolved hyponatremia and improved renal indices. D/C IV Normal saline 

infusion. See attending physician orders.








(2) Intractable nausea and vomiting


Qualifiers: 


     Vomiting type: unspecified     Qualified Code(s): R11.2 - Nausea with 

vomiting, unspecified  


Is this a current diagnosis for this admission?: YesPlan: 








Resolved. See attending physician orders.








(3) Seizure


Is this a current diagnosis for this admission?: YesPlan: 





Resolved acute seizure activity. See attending physician orders.








(4) Intractable hiccups


Is this a current diagnosis for this admission?: YesPlan: 











Resolved with increased Gabapentin dosage adjustment. See attending physician 

orders.








(5) HLD (hyperlipidemia)


Qualifiers: 


     Hyperlipidemia type: pure hypercholesterolemia        Qualified Code(s): 

E78.00 - Pure hypercholesterolemia, unspecified; E78.0 - Pure 

hypercholesterolemia  


Is this a current diagnosis for this admission?: YesPlan: 


See attending physician orders.








(6) HTN (hypertension)


Qualifiers: 


     Hypertension type: essential hypertension        Qualified Code(s): I10 - 

Essential (primary) hypertension  


Is this a current diagnosis for this admission?: YesPlan: 


See attending physician orders.








(7) Hypopotassemia


Is this a current diagnosis for this admission?: YesPlan: 











Resolved. See attending physician orders.











- Time


Time Spent with patient: 25-34 minutes


Medications reviewed and adjusted accordingly: Yes


Anticipated discharge: Home


Within: within 24 hours





- Inpatient Certification


Medical Necessity: Need Close Monitoring Due to Risk of Patient Decompensation, 

Need For IV Fluids, Need For Continuous Telemetry Monitoring, Risk of 

Complication if Not Cared For in Hospital


Post Hospital Care: D/C Planner Documentation





- Plan Summary


Plan Summary: 


See attending physician orders.

## 2017-04-10 RX ADMIN — SIMVASTATIN SCH MG: 10 TABLET, FILM COATED ORAL at 21:14

## 2017-04-10 RX ADMIN — EXTENDED PHENYTOIN SODIUM SCH MG: 100 CAPSULE ORAL at 13:53

## 2017-04-10 RX ADMIN — ENOXAPARIN SODIUM SCH MG: 40 INJECTION SUBCUTANEOUS at 08:38

## 2017-04-10 RX ADMIN — GABAPENTIN SCH MG: 300 CAPSULE ORAL at 05:36

## 2017-04-10 RX ADMIN — GABAPENTIN SCH MG: 300 CAPSULE ORAL at 21:14

## 2017-04-10 RX ADMIN — PHENOBARBITAL SCH MG: 32.4 TABLET ORAL at 10:32

## 2017-04-10 RX ADMIN — LANSOPRAZOLE SCH MG: 30 TABLET, ORALLY DISINTEGRATING, DELAYED RELEASE ORAL at 05:36

## 2017-04-10 RX ADMIN — PHENOBARBITAL SCH MG: 32.4 TABLET ORAL at 17:56

## 2017-04-10 RX ADMIN — Medication SCH ML: at 13:53

## 2017-04-10 RX ADMIN — Medication SCH ML: at 21:15

## 2017-04-10 RX ADMIN — EXTENDED PHENYTOIN SODIUM SCH MG: 100 CAPSULE ORAL at 08:38

## 2017-04-10 RX ADMIN — LISINOPRIL SCH MG: 10 TABLET ORAL at 10:32

## 2017-04-10 RX ADMIN — GABAPENTIN SCH MG: 300 CAPSULE ORAL at 13:54

## 2017-04-10 RX ADMIN — EXTENDED PHENYTOIN SODIUM SCH MG: 100 CAPSULE ORAL at 21:15

## 2017-04-10 NOTE — PDOC PROGRESS REPORT
Subjective


Progress Note for:: 04/10/17


Subjective:: 


Patient had episode of nausea with vomiting since last clinical evaluation with 

need for IV Thorazine administration. No seizure activity, chest pain or 

difficulty with breathing. No nausea, vomiting, or abdominal pain. No fever or 

chills. 





Physical Exam


Vital Signs: 


 











Temp Pulse Resp BP Pulse Ox


 


 98.4 F   68   18   147/73 H  96 


 


 04/10/17 07:20  04/10/17 07:20  04/10/17 07:20  04/10/17 07:20  04/10/17 07:20








 Intake & Output











 04/09/17 04/10/17 04/11/17





 06:59 06:59 06:59


 


Intake Total 4882 4604 


 


Output Total 2350 3550 


 


Balance 2532 1054 


 


Weight 73.6 kg 71.9 kg 











Physical Exam: 


General appearance: PRESENT: no acute distress, cooperative


Head exam: PRESENT: atraumatic, normocephalic


Eye exam: PRESENT: conjunctiva pink, EOMI, PERRLA.  ABSENT: scleral icterus


Mouth exam: PRESENT: moist


Respiratory exam: PRESENT: clear to auscultation sandro


Cardiovascular exam: PRESENT: RRR.  ABSENT: diastolic murmur, rubs, systolic 

murmur


GI/Abdominal exam: PRESENT: normal bowel sounds, soft.  ABSENT: distended, 

guarding, mass, organomegaly, rebound, tenderness


Extremities exam: PRESENT: full ROM


Musculoskeletal exam: PRESENT: deformity - related to joint involvement with 

arthritis


Neurological exam: PRESENT: alert, awake, oriented to person, oriented to place

, oriented to time, oriented to situation, CN II-XII grossly intact, ABSENT: 

motor sensory deficit


Psychiatric exam: PRESENT: appropriate affect, normal mood.  ABSENT: homicidal 

ideation, suicidal ideation


Skin exam: PRESENT: dry, intact, warm.  ABSENT: cyanosis, rash








Results


Laboratory Results: 


 





 04/07/17 04:51 





 04/08/17 12:07 








Impressions: 


 





Chest X-Ray  04/06/17 00:00


IMPRESSION:  NO SIGNIFICANT RADIOGRAPHIC FINDING IN THE CHEST.


 














Assessment & Plan





- Diagnosis


(1) Dehydration with hyponatremia


Is this a current diagnosis for this admission?: YesPlan: 











D/C IV Normal saline infusion. See attending physician orders.








(2) Intractable nausea and vomiting


Qualifiers: 


     Vomiting type: unspecified     Qualified Code(s): R11.2 - Nausea with 

vomiting, unspecified  


Is this a current diagnosis for this admission?: YesPlan: 








Resolved. See attending physician orders.








(3) Seizure


Is this a current diagnosis for this admission?: YesPlan: 








Resolved acute seizure activity. Emphasized compliance with anti seizure 

medication upon discharge. See attending physician orders.








(4) Intractable hiccups


Is this a current diagnosis for this admission?: YesPlan: 














Improved. See attending physician orders.








(5) HLD (hyperlipidemia)


Qualifiers: 


     Hyperlipidemia type: pure hypercholesterolemia        Qualified Code(s): 

E78.00 - Pure hypercholesterolemia, unspecified; E78.0 - Pure 

hypercholesterolemia  


Is this a current diagnosis for this admission?: YesPlan: 


See attending physician orders.








(6) HTN (hypertension)


Qualifiers: 


     Hypertension type: essential hypertension        Qualified Code(s): I10 - 

Essential (primary) hypertension  


Is this a current diagnosis for this admission?: YesPlan: 


See attending physician orders.








(7) Hypopotassemia


Is this a current diagnosis for this admission?: YesPlan: 











Resolved. See attending physician orders.











- Time


Time Spent with patient: 25-34 minutes


Medications reviewed and adjusted accordingly: Yes


Anticipated discharge: Home


Within: within 24 hours





- Inpatient Certification


Medical Necessity: Need Close Monitoring Due to Risk of Patient Decompensation, 

Need For IV Fluids, Need For Continuous Telemetry Monitoring, Risk of 

Complication if Not Cared For in Hospital


Post Hospital Care: D/C Planner Documentation





- Plan Summary


Plan Summary: 


See attending physician orders.

## 2017-04-11 VITALS — SYSTOLIC BLOOD PRESSURE: 108 MMHG | DIASTOLIC BLOOD PRESSURE: 66 MMHG

## 2017-04-11 RX ADMIN — PHENOBARBITAL SCH MG: 32.4 TABLET ORAL at 09:12

## 2017-04-11 RX ADMIN — LISINOPRIL SCH MG: 10 TABLET ORAL at 09:12

## 2017-04-11 RX ADMIN — ENOXAPARIN SODIUM SCH MG: 40 INJECTION SUBCUTANEOUS at 09:18

## 2017-04-11 RX ADMIN — GABAPENTIN SCH MG: 300 CAPSULE ORAL at 05:51

## 2017-04-11 RX ADMIN — Medication SCH ML: at 05:51

## 2017-04-11 RX ADMIN — LANSOPRAZOLE SCH MG: 30 TABLET, ORALLY DISINTEGRATING, DELAYED RELEASE ORAL at 05:51

## 2017-04-11 RX ADMIN — EXTENDED PHENYTOIN SODIUM SCH MG: 100 CAPSULE ORAL at 09:12

## 2017-04-11 NOTE — PDOC DISCHARGE SUMMARY
General





- Admit/Disc Date/PCP


Admission Date/Primary Care Provider: 


  04/06/17 17:46





  RAPHAEL DEANN





Discharge Date: 04/11/17





- Discharge Diagnosis


(1) Dehydration with hyponatremia


Is this a current diagnosis for this admission?: Yes





(2) Intractable nausea and vomiting


Is this a current diagnosis for this admission?: Yes





(3) Seizure


Is this a current diagnosis for this admission?: Yes





(4) Intractable hiccups


Is this a current diagnosis for this admission?: Yes





(5) HLD (hyperlipidemia)


Is this a current diagnosis for this admission?: Yes





(6) HTN (hypertension)


Is this a current diagnosis for this admission?: Yes





(7) Hypopotassemia


Is this a current diagnosis for this admission?: Yes








- Additional Information


Resuscitation Status: Full Code


Discharge Diet: Other (Comments) - Low salt diet


Discharge Activity: Activity As Tolerated, No Driving


Home Medications: 








Aspirin [Ecotrin 81 mg EC Tablet] 81 mg PO DAILY 04/06/17 


Lisinopril [Prinivil 10 mg Tablet] 10 mg PO DAILY 04/06/17 


Phenobarbital [Phenobarbital 32.4 mg Tablet] 32.4 mg PO BID 04/06/17 


Phenytoin Sodium Extended [Dilantin 100 mg Capsule.er] 100 mg PO QAM 04/06/17 


Phenytoin Sodium Extended [Dilantin 100 mg Capsule.er] 100 mg PO QHS 04/06/17 


Phenytoin Sodium Extended [Dilantin 100 mg Capsule.er] 200 mg PO DAILY@1400 04/ 06/17 


Simvastatin [Zocor 10 mg Tablet] 10 mg PO QHS 04/06/17 


Simvastatin [Zocor 20 mg Tablet] 20 mg PO DAILY 04/06/17 


Gabapentin [Neurontin] 600 mg PO TID #90 tablet 04/11/17 


Trimethobenzamide HCl [Tigan 300 mg Capsule] 300 mg PO QIDP PRN #60 capsule 04/ 11/17 











History of Present Illness


History of Present Illness: 


ANGY GILBERT is a 66 year old male known to my practice who was brought 

to the ED by medic due spousal reported episode of seizure activity at home. It 

was reported as tonic-clonic type that lasted about 2 minutes and recurrent at 

home before his wife alerted EMS. He had another episode while been evaluated 

in the ED. Spouse reported episodes of nausea and vomiting for the past 3 days. 

She denied any recent alcohol usage or ingestion by the patient. Due to his 

nausea and vomiting maintenance on his anti seizure medication have been 

difficult. Spouse reported complain about abdominal pain and persistent 

hiccups. She denied associated diarrhea, fever, or chills. No reported chest 

pain, or difficulty with breathing. There rattling cough without significant 

sputum production. At the time of my evaluation patient has had IV Lorazepam, 

oral Dilantin and Phenobarbital administration, following command but not full 

at his preadmission state.





Hospital Course


Hospital Course: 


Patient presented with recurrent seizure activities following couple of days 

with persistent hiccups, nausea, and vomiting. His presenting anti seizure 

medications including phenytoin and phenobarbital were reported subtherapeutic. 

He was found to be dehydrated with hyponatremia probably due to persistent 

vomiting. He was managed with IV Lorazepam for active seizure with satisfactory 

response. Patient had IV Thorazine for his intractable hiccups with good 

response and resolution of symptoms. He was able to tolerate his oral 

medications. His Gabapentin was increased to 600 mg p.o tid for his hiccups 

management and beneficial effect on his seizure. He has remain seizure free 

since admission and agreeable to discharge home today. He will follow up with 

me in the office as instructed upon discharge.





Physical Exam


Vital Signs: 


 











Temp Pulse Resp BP Pulse Ox


 


 98.6 F   60   20   124/62   92 


 


 04/11/17 04:02  04/11/17 04:02  04/11/17 04:02  04/11/17 04:02  04/11/17 04:02








 Intake & Output











 04/10/17 04/11/17 04/12/17





 06:59 06:59 06:59


 


Intake Total 4604 2425 


 


Output Total 3550 600 


 


Balance 1054 1825 


 


Weight 71.9 kg 75.8 kg 











Physical Exam: 


General appearance: PRESENT: no acute distress, cooperative


Head exam: PRESENT: atraumatic, normocephalic


Eye exam: PRESENT: conjunctiva pink, EOMI, PERRLA.  ABSENT: scleral icterus


Mouth exam: PRESENT: moist


Respiratory exam: PRESENT: clear to auscultation sandro


Cardiovascular exam: PRESENT: RRR.  ABSENT: diastolic murmur, rubs, systolic 

murmur


GI/Abdominal exam: PRESENT: normal bowel sounds, soft.  ABSENT: distended, 

guarding, mass, organomegaly, rebound, tenderness


Extremities exam: PRESENT: full ROM


Musculoskeletal exam: PRESENT: deformity - related to joint involvement with 

arthritis


Neurological exam: PRESENT: alert, awake, oriented to person, oriented to place

, oriented to time, oriented to situation, CN II-XII grossly intact, ABSENT: 

motor sensory deficit


Psychiatric exam: PRESENT: appropriate affect, normal mood.  ABSENT: homicidal 

ideation, suicidal ideation


Skin exam: PRESENT: dry, intact, warm.  ABSENT: cyanosis, rash





Results


Laboratory Results: 


 





 04/07/17 04:51 





 04/08/17 12:07 








Impressions: 


 





Chest X-Ray  04/06/17 00:00


IMPRESSION:  NO SIGNIFICANT RADIOGRAPHIC FINDING IN THE CHEST.


 














Qualifiers


**PATEINT BEING DISCHARGED WITH ANY OF THE FOLLOWING DIAGNOSIS?: No





Plan


Discharge Plan: 


D/C home today with follow up in the office as instructed upon discharge.


Time Spent: Less than 30 Minutes

## 2018-05-23 ENCOUNTER — HOSPITAL ENCOUNTER (OUTPATIENT)
Dept: HOSPITAL 62 - OD | Age: 68
End: 2018-05-23
Attending: INTERNAL MEDICINE
Payer: MEDICARE

## 2018-05-23 DIAGNOSIS — G40.909: Primary | ICD-10-CM

## 2018-05-23 PROCEDURE — 80185 ASSAY OF PHENYTOIN TOTAL: CPT

## 2018-05-23 PROCEDURE — 36415 COLL VENOUS BLD VENIPUNCTURE: CPT

## 2018-09-23 ENCOUNTER — HOSPITAL ENCOUNTER (INPATIENT)
Dept: HOSPITAL 62 - ER | Age: 68
LOS: 5 days | Discharge: HOME | DRG: 177 | End: 2018-09-28
Attending: INTERNAL MEDICINE | Admitting: INTERNAL MEDICINE
Payer: MEDICARE

## 2018-09-23 DIAGNOSIS — I10: ICD-10-CM

## 2018-09-23 DIAGNOSIS — J96.01: ICD-10-CM

## 2018-09-23 DIAGNOSIS — E78.00: ICD-10-CM

## 2018-09-23 DIAGNOSIS — G40.909: ICD-10-CM

## 2018-09-23 DIAGNOSIS — T42.0X5A: ICD-10-CM

## 2018-09-23 DIAGNOSIS — Z79.82: ICD-10-CM

## 2018-09-23 DIAGNOSIS — J69.0: Primary | ICD-10-CM

## 2018-09-23 LAB
ADD MANUAL DIFF: NO
ALBUMIN SERPL-MCNC: 3.9 G/DL (ref 3.5–5)
ALP SERPL-CCNC: 122 U/L (ref 38–126)
ALT SERPL-CCNC: 11 U/L (ref 21–72)
ANION GAP SERPL CALC-SCNC: 9 MMOL/L (ref 5–19)
APPEARANCE UR: CLEAR
APTT PPP: (no result) S
ARTERIAL BLOOD FIO2: (no result)
ARTERIAL BLOOD H2CO3: 1.04 MMOL/L (ref 1.05–1.35)
ARTERIAL BLOOD HCO3: 24 MMOL/L (ref 20–24)
ARTERIAL BLOOD PCO2: 34.7 MMHG (ref 35–45)
ARTERIAL BLOOD PH: 7.46 (ref 7.35–7.45)
ARTERIAL BLOOD PO2: 69.4 MMHG (ref 80–100)
ARTERIAL BLOOD TOTAL CO2: 25 MMOL/L (ref 23–27)
AST SERPL-CCNC: 21 U/L (ref 17–59)
BASE EXCESS BLDA CALC-SCNC: 0.5 MMOL/L
BASOPHILS # BLD AUTO: 0 10^3/UL (ref 0–0.2)
BASOPHILS NFR BLD AUTO: 0.1 % (ref 0–2)
BILIRUB DIRECT SERPL-MCNC: 0.6 MG/DL (ref 0–0.4)
BILIRUB SERPL-MCNC: 0.6 MG/DL (ref 0.2–1.3)
BILIRUB UR QL STRIP: NEGATIVE
BUN SERPL-MCNC: 15 MG/DL (ref 7–20)
CALCIUM: 9 MG/DL (ref 8.4–10.2)
CHLORIDE SERPL-SCNC: 102 MMOL/L (ref 98–107)
CK MB SERPL-MCNC: 0.56 NG/ML (ref ?–4.55)
CK MB SERPL-MCNC: 0.62 NG/ML (ref ?–4.55)
CK SERPL-CCNC: 106 U/L (ref 55–170)
CO2 SERPL-SCNC: 27 MMOL/L (ref 22–30)
EOSINOPHIL # BLD AUTO: 0 10^3/UL (ref 0–0.6)
EOSINOPHIL NFR BLD AUTO: 0.2 % (ref 0–6)
ERYTHROCYTE [DISTWIDTH] IN BLOOD BY AUTOMATED COUNT: 13.9 % (ref 11.5–14)
GLUCOSE SERPL-MCNC: 132 MG/DL (ref 75–110)
GLUCOSE UR STRIP-MCNC: NEGATIVE MG/DL
HCT VFR BLD CALC: 37 % (ref 37.9–51)
HGB BLD-MCNC: 12.2 G/DL (ref 13.5–17)
KETONES UR STRIP-MCNC: NEGATIVE MG/DL
LYMPHOCYTES # BLD AUTO: 0.4 10^3/UL (ref 0.5–4.7)
LYMPHOCYTES NFR BLD AUTO: 5.7 % (ref 13–45)
MCH RBC QN AUTO: 28.6 PG (ref 27–33.4)
MCHC RBC AUTO-ENTMCNC: 33 G/DL (ref 32–36)
MCV RBC AUTO: 86 FL (ref 80–97)
MONOCYTES # BLD AUTO: 0.2 10^3/UL (ref 0.1–1.4)
MONOCYTES NFR BLD AUTO: 3.7 % (ref 3–13)
NEUTROPHILS # BLD AUTO: 5.8 10^3/UL (ref 1.7–8.2)
NEUTS SEG NFR BLD AUTO: 90.3 % (ref 42–78)
NITRITE UR QL STRIP: NEGATIVE
NT PRO BNP: 1040 PG/ML (ref 5–900)
PH UR STRIP: 7 [PH] (ref 5–9)
PLATELET # BLD: 207 10^3/UL (ref 150–450)
POTASSIUM SERPL-SCNC: 4.3 MMOL/L (ref 3.6–5)
PROT SERPL-MCNC: 7.8 G/DL (ref 6.3–8.2)
PROT UR STRIP-MCNC: NEGATIVE MG/DL
RBC # BLD AUTO: 4.29 10^6/UL (ref 4.35–5.55)
SAO2 % BLDA: 94.9 % (ref 94–98)
SODIUM SERPL-SCNC: 138.1 MMOL/L (ref 137–145)
SP GR UR STRIP: 1.01
TOTAL CELLS COUNTED % (AUTO): 100 %
TROPONIN I SERPL-MCNC: 0.07 NG/ML
TROPONIN I SERPL-MCNC: < 0.012 NG/ML
UROBILINOGEN UR-MCNC: 4 MG/DL (ref ?–2)
WBC # BLD AUTO: 6.4 10^3/UL (ref 4–10.5)

## 2018-09-23 PROCEDURE — 83605 ASSAY OF LACTIC ACID: CPT

## 2018-09-23 PROCEDURE — 36415 COLL VENOUS BLD VENIPUNCTURE: CPT

## 2018-09-23 PROCEDURE — 94640 AIRWAY INHALATION TREATMENT: CPT

## 2018-09-23 PROCEDURE — 80185 ASSAY OF PHENYTOIN TOTAL: CPT

## 2018-09-23 PROCEDURE — 87205 SMEAR GRAM STAIN: CPT

## 2018-09-23 PROCEDURE — 3E0F73Z INTRODUCTION OF ANTI-INFLAMMATORY INTO RESPIRATORY TRACT, VIA NATURAL OR ARTIFICIAL OPENING: ICD-10-PCS | Performed by: INTERNAL MEDICINE

## 2018-09-23 PROCEDURE — 83880 ASSAY OF NATRIURETIC PEPTIDE: CPT

## 2018-09-23 PROCEDURE — 85025 COMPLETE CBC W/AUTO DIFF WBC: CPT

## 2018-09-23 PROCEDURE — 80184 ASSAY OF PHENOBARBITAL: CPT

## 2018-09-23 PROCEDURE — 71045 X-RAY EXAM CHEST 1 VIEW: CPT

## 2018-09-23 PROCEDURE — 96361 HYDRATE IV INFUSION ADD-ON: CPT

## 2018-09-23 PROCEDURE — 82550 ASSAY OF CK (CPK): CPT

## 2018-09-23 PROCEDURE — 94667 MNPJ CHEST WALL 1ST: CPT

## 2018-09-23 PROCEDURE — 99291 CRITICAL CARE FIRST HOUR: CPT

## 2018-09-23 PROCEDURE — 71250 CT THORAX DX C-: CPT

## 2018-09-23 PROCEDURE — 82553 CREATINE MB FRACTION: CPT

## 2018-09-23 PROCEDURE — 83036 HEMOGLOBIN GLYCOSYLATED A1C: CPT

## 2018-09-23 PROCEDURE — 87040 BLOOD CULTURE FOR BACTERIA: CPT

## 2018-09-23 PROCEDURE — 93010 ELECTROCARDIOGRAM REPORT: CPT

## 2018-09-23 PROCEDURE — 87086 URINE CULTURE/COLONY COUNT: CPT

## 2018-09-23 PROCEDURE — 87070 CULTURE OTHR SPECIMN AEROBIC: CPT

## 2018-09-23 PROCEDURE — 93005 ELECTROCARDIOGRAM TRACING: CPT

## 2018-09-23 PROCEDURE — 71046 X-RAY EXAM CHEST 2 VIEWS: CPT

## 2018-09-23 PROCEDURE — 80053 COMPREHEN METABOLIC PANEL: CPT

## 2018-09-23 PROCEDURE — 94799 UNLISTED PULMONARY SVC/PX: CPT

## 2018-09-23 PROCEDURE — 81001 URINALYSIS AUTO W/SCOPE: CPT

## 2018-09-23 PROCEDURE — 82803 BLOOD GASES ANY COMBINATION: CPT

## 2018-09-23 PROCEDURE — 96360 HYDRATION IV INFUSION INIT: CPT

## 2018-09-23 PROCEDURE — 84484 ASSAY OF TROPONIN QUANT: CPT

## 2018-09-23 RX ADMIN — PHENOBARBITAL SCH MG: 32.4 TABLET ORAL at 21:34

## 2018-09-23 RX ADMIN — SIMVASTATIN SCH MG: 10 TABLET, FILM COATED ORAL at 21:35

## 2018-09-23 RX ADMIN — IPRATROPIUM BROMIDE AND ALBUTEROL SULFATE SCH ML: 2.5; .5 SOLUTION RESPIRATORY (INHALATION) at 19:40

## 2018-09-23 RX ADMIN — SODIUM CHLORIDE PRN MLS/HR: 9 INJECTION, SOLUTION INTRAVENOUS at 18:24

## 2018-09-23 RX ADMIN — PIPERACILLIN AND TAZOBACTAM SCH MLS/HR: 3; .375 INJECTION, POWDER, LYOPHILIZED, FOR SOLUTION INTRAVENOUS; PARENTERAL at 18:24

## 2018-09-23 RX ADMIN — Medication SCH ML: at 21:36

## 2018-09-23 RX ADMIN — IPRATROPIUM BROMIDE AND ALBUTEROL SULFATE SCH ML: 2.5; .5 SOLUTION RESPIRATORY (INHALATION) at 16:18

## 2018-09-23 RX ADMIN — GABAPENTIN SCH MG: 300 CAPSULE ORAL at 21:34

## 2018-09-23 NOTE — PDOC H&P
History of Present Illness


Admission Date/PCP: 


  09/23/18 11:36





  RAPHAEL ZACARIAS





History of Present Illness: 


ANGY GILBERT is a 67 year old male, he has a history of seizure, on 

Dilantin, patient was brought to the emergency room by his spouse with EMS for 

evaluation of difficulty breathing, possible seizure poor response to verbal 

command.  Patient spouse was worried, she thought the patient was having a 

seizure, he has a history of seizure disorder, in the emergency room he was 

evaluated, he was found to have elevated Dilantin level, the chest x-ray 

suggests pneumonia.  I requested for CT scan of his lung without contrast, it 

demonstrated, debris or mucus in the right mainstem bronchus, also found was 

patchy diffuse airspace disease in the right upper lobe, right lower lobe, left 

patchy disease in the superior segment lower lobe worrisome for pneumonia, this 

suggests aspiration pneumonia based on the CT scan findings.  I saw him in the 

emergency room, he is awake and responsive to my questions, he denies any cough 

but he admitted to shortness of breath the oxygen saturation on 2 L nasal 

cannula is more than 90%








Past Medical History


Cardiac Medical History: Reports: Hyperlipidema, Hypertension


Neurological Medical History: Reports: Seizures





Past Surgical History


Past Surgical History: Reports: Other - Brain tumor  Fibrous Dysplasia resection





Social History


Lives with: Family


Smoking Status: Former Smoker


Frequency of Alcohol Use: None


Hx Recreational Drug Use: No


Drugs: None


Hx Prescription Drug Abuse: No





Family History


Family History: Reviewed & Not Pertinent


Parental Family History Reviewed: Yes


Children Family History Reviewed: Yes


Sibling(s) Family History Reviewed.: Yes





Medication/Allergy


Home Medications: 








Aspirin [Ecotrin 81 mg EC Tablet] 81 mg PO DAILY 09/23/18 


Gabapentin [Neurontin] 600 mg PO Q8 09/23/18 


Lisinopril [Prinivil] 20 mg PO DAILY 09/23/18 


Phenobarbital [Phenobarbital 32.4 mg Tablet] 32.4 mg PO Q12 09/23/18 


Phenytoin Sodium Extended [Dilantin 100 mg Capsule.er] 200 mg PO Q12 09/23/18 


Simvastatin [Zocor 20 mg Tablet] 20 mg PO QHS 09/23/18 








Allergies/Adverse Reactions: 


 





No Known Allergies Allergy (Verified 10/24/17 10:17)


 











Review of Systems


Constitutional: PRESENT: chills, fever(s)


Eyes: ABSENT: visual disturbances


Ears: ABSENT: hearing changes


Cardiovascular: ABSENT: chest pain, dyspnea on exertion, edema, orthropnea, 

palpitations


Respiratory: PRESENT: dyspnea


Gastrointestinal: ABSENT: abdominal pain, constipation, diarrhea, hematemesis, 

hematochezia, nausea, vomiting


Genitourinary: ABSENT: dysuria, hematuria


Musculoskeletal: ABSENT: joint swelling


Integumentary: ABSENT: rash, wounds


Neurological: ABSENT: abnormal gait, abnormal speech, confusion, dizziness, 

focal weakness, syncope


Psychiatric: ABSENT: anxiety, depression, homidical ideation, suicidal ideation


Endocrine: ABSENT: cold intolerance, heat intolerance, menstrual abnormalities, 

polydipsia, polyuria


Hematologic/Lymphatic: ABSENT: easy bleeding, easy bruising, lymphadenopathy





Physical Exam


Vital Signs: 


 











Temp Pulse Resp BP Pulse Ox


 


 101.9 F H     14   107/60   100 


 


 09/23/18 05:58     09/23/18 13:01  09/23/18 13:00  09/23/18 13:01








 Intake & Output











 09/22/18 09/23/18 09/24/18





 06:59 06:59 06:59


 


Intake Total   150


 


Balance   150











General appearance: PRESENT: mild distress


Head exam: PRESENT: atraumatic, normocephalic


Eye exam: PRESENT: PERRLA


Ear exam: PRESENT: normal external ear exam


Mouth exam: PRESENT: moist, tongue midline


Neck exam: PRESENT: full ROM


Respiratory exam: PRESENT: crackles, other - He has crackles in both lung fields

, worse on the right upper and lower lungs, minimal on the left lower lung


Cardiovascular exam: PRESENT: RRR, +S1, +S2


Pulses: PRESENT: normal dorsalis pedis pul, +2 pedal pulses bilateral


Vascular exam: PRESENT: normal capillary refill


GI/Abdominal exam: PRESENT: normal bowel sounds, soft


Rectal exam: PRESENT: deferred


Neurological exam: PRESENT: alert, awake, oriented to person, oriented to place

, oriented to time, oriented to situation, CN II-XII grossly intact


Psychiatric exam: PRESENT: appropriate affect, normal mood


Skin exam: PRESENT: dry, intact, warm





Results


Impressions: 


 





Chest CT  09/23/18 00:00


IMPRESSION:  Patchy bilateral airspace disease worrisome pneumonia.


 








Chest X-Ray  09/23/18 06:53


IMPRESSION:  RIGHT LOWER LOBE AIRSPACE DISEASE CONCERNING FOR PNEUMONIA.


 














Assessment & Plan





- Diagnosis


(1) Acute hypoxemic respiratory failure


Is this a current diagnosis for this admission?: Yes   


Plan: 


Continue oxygen through nasal cannula








(2) Aspiration pneumonia


Qualifiers: 


   Aspiration pneumonia type: unspecified   Laterality: bilateral   Lung 

location: unspecified part of lung   Qualified Code(s): J69.0 - Pneumonitis due 

to inhalation of food and vomit   


Is this a current diagnosis for this admission?: Yes   


Plan: 


Start patient on antibiotic to cover anaerobes, gram-negative and gram-positive 

pathogens, CT scan findings is consistent with aspiration pneumonia, he has 

debris in the right mainstem bronchus, he has patchy diffuse airspace disease 

in both right and lower lobe of the right lung and lower lobe of the left lung, 

he will be started on Zosyn and Levaquin, Levaquin to cover atypical pathogens








(3) Dilantin toxicity


Qualifiers: 


   Encounter type: initial encounter   Injury intent: undetermined intent   

Qualified Code(s): T42.0X4A - Poisoning by hydantoin derivatives, undetermined, 

initial encounter   


Is this a current diagnosis for this admission?: Yes   


Plan: 


Dilantin on hold 








(4) Metabolic encephalopathy


Is this a current diagnosis for this admission?: Yes   


Plan: 


Most likely due to combination of Dilantin toxicity and pneumonia








(5) Epilepsy


Qualifiers: 


   Epilepsy type: unspecified   Intractability: not intractable   Status 

epilepticus: without status epilepticus   Qualified Code(s): G40.909 - Epilepsy

, unspecified, not intractable, without status epilepticus   


Is this a current diagnosis for this admission?: Yes

## 2018-09-23 NOTE — EKG REPORT
SEVERITY:- ABNORMAL ECG -

SINUS RHYTHM

LEFT AXIS DEVIATION

CONSIDER POSTERIOR INFARCT

BORDERLINE T ABNORMALITIES, ANT-LAT LEADS

:

Confirmed by: Jessica Proctor MD 23-Sep-2018 14:32:43

## 2018-09-23 NOTE — ER DOCUMENT REPORT
ED Seizure





- General


Information source: Patient





<SINAN CHOWDARY - Last Filed: 09/23/18 07:03>





<ANGELLASHANDACOURTNEY - Last Filed: 09/23/18 10:03>





- General


Chief Complaint: Seizure


Stated Complaint: POSSIBLE SEIZURE


Time Seen by Provider: 09/23/18 06:28


Notes: 





67-year-old male with a history of brain tumor fibrous dysplasia resection in 

2009 that presents to the emergency department today with complaints of a 

seizure.  Patient developed seizures after having this tumor removed.  Patient'

s last seizure was October 25 and at that time his seizure medications were 

found to be subtherapeutic.  Family at bedside states that at 2200 the patient 

was "spitting up" but was able to communicate and did not seem disoriented 

however at 0400 this morning when family checked on him he was disoriented as 

if he had just had a seizure.  Family states there they never visualized any 

shaking.  Patient is on phenobarbital and Dilantin for seizures.  Family states 

after the patient has a seizure he is sometimes postictal for an extended 

period of time and his symptoms today are consistent with his postictal 

symptoms in the past. (SINAN CHOWDARY)





- Related Data


Allergies/Adverse Reactions: 


 





No Known Allergies Allergy (Verified 10/24/17 10:17)


 











Past Medical History





- General


Information source: Patient





- Social History


Smoking Status: Unknown if Ever Smoked


Cigarette use (# per day): No


Frequency of alcohol use: None


Drug Abuse: None


Lives with: Family


Family History: Reviewed & Not Pertinent


Patient has suicidal ideation: No


Patient has homicidal ideation: No





- Past Medical History


Cardiac Medical History: Reports: Hx Hypercholesterolemia, Hx Hypertension


Neurological Medical History: Reports: Hx Seizures


Past Surgical History: Reports: Hx Neurologic Surgery - Brain tumor removal, 

Other - Brain tumor  Fibrous Dysplasia resection





- Immunizations


Hx Diphtheria, Pertussis, Tetanus Vaccination: Yes


Hx Pneumococcal Vaccination: 10/16/14





<SINAN CHOWDARY - Last Filed: 09/23/18 07:03>





Review of Systems





- Review of Systems


-: Yes ROS unobtainable due to patient's medical condition - Postictal


Constitutional: No symptoms reported


EENT: No symptoms reported


Cardiovascular: No symptoms reported


Respiratory: No symptoms reported


Gastrointestinal: No symptoms reported


Genitourinary: No symptoms reported


Male Genitourinary: No symptoms reported


Musculoskeletal: No symptoms reported


Skin: No symptoms reported


Hematologic/Lymphatic: No symptoms reported


Neurological/Psychological: See HPI, Seizure


-: Yes All other systems reviewed and negative





<SINAN CHOWDARY - Last Filed: 09/23/18 07:03>





- Vital signs


Vitals: 


 











Temp


 


 101.9 F H


 


 09/23/18 05:58














Course





- Laboratory


Result Diagrams: 


 09/23/18 06:27





 09/23/18 06:27





<SINAN CHOWDARY - Last Filed: 09/23/18 07:03>





- Laboratory


Result Diagrams: 


 09/23/18 06:27





 09/23/18 06:27





- Diagnostic Test


Radiology reviewed: Image reviewed - Lower lobe pneumonia





- EKG Interpretation by Me


EKG shows normal: Sinus rhythm, Axis, Intervals, QRS Complexes.  abnormal: ST-T 

Waves - Borderline anterior lateral T abnormalities


Rate: Normal - 75


Rhythm: NSR


Axis/QRS: Left axis deviation


When compared to previous EKG there are: Changes noted - Anterolateral T 

abnormalities are new





- Consults


  ** Dr. Hendricks


Time consulted: 10:00


Consulted provider: will see as inpatient





<COURTNEY PERALES - Last Filed: 09/23/18 10:03>





- Vital Signs


Vital signs: 


 











Temp Pulse Resp BP Pulse Ox


 


 101.9 F H     21 H  160/72 H  94 


 


 09/23/18 05:58     09/23/18 06:16  09/23/18 06:16  09/23/18 06:16














- Laboratory


Laboratory results interpreted by me: 


 











  09/23/18 09/23/18 09/23/18





  06:27 06:27 06:54


 


RBC  4.29 L  


 


Hgb  12.2 L  


 


Hct  37.0 L  


 


Seg Neutrophils %  90.3 H  


 


Lymphocytes %  5.7 L  


 


Absolute Lymphocytes  0.4 L  


 


Glucose   132 H 


 


Direct Bilirubin   0.6 H 


 


ALT   11 L 


 


Urine Blood   


 


Urine Urobilinogen   


 


Phenytoin    29.1 H*














  09/23/18





  08:16


 


RBC 


 


Hgb 


 


Hct 


 


Seg Neutrophils % 


 


Lymphocytes % 


 


Absolute Lymphocytes 


 


Glucose 


 


Direct Bilirubin 


 


ALT 


 


Urine Blood  SMALL H


 


Urine Urobilinogen  4.0 H


 


Phenytoin 














Critical Care Note





- Critical Care Note


Total time excluding time spent on procedures (mins): 35





<COURTNEY PERALES - Last Filed: 09/23/18 10:03>





Discharge





<SINAN CHOWDARY - Last Filed: 09/23/18 07:03>





- Discharge


Admitting Provider: Adi - Dr. Hendricks covering


Unit Admitted: Telemetry





<COURTNEY PERALES - Last Filed: 09/23/18 10:03>





- Discharge


Clinical Impression: 


 Confusion, Seizure





Dilantin toxicity


Qualifiers:


 Encounter type: initial encounter Injury intent: accidental or unintentional 

Qualified Code(s): T42.0X1A - Poisoning by hydantoin derivatives, accidental (

unintentional), initial encounter





Altered mental status


Qualifiers:


 Altered mental status type: unspecified Qualified Code(s): R41.82 - Altered 

mental status, unspecified





Fever


Qualifiers:


 Fever type: unspecified Qualified Code(s): R50.9 - Fever, unspecified





Right lower lobe pneumonia


Qualifiers:


 Pneumonia type: due to unspecified organism Qualified Code(s): J18.1 - Lobar 

pneumonia, unspecified organism





HTN (hypertension)


Qualifiers:


 Hypertension type: essential hypertension Qualified Code(s): I10 - Essential (

primary) hypertension





Condition: Good


Disposition: ADMITTED AS INPATIENT


Referrals: 


RAPHAEL ZACARIAS MD [Primary Care Provider] - Follow up as needed


Scribe Attestation: 





09/23/18 08:02


I personally performed the services described in the documentation, reviewed 

and edited the documentation which was dictated to the scribe in my presence, 

and it accurately records my words and actions. (COURTNEY PERALES)





Scribe Documentation





- Scribe


Written by Scribe:: José Atkins, 9/23/2018 0745


acting as scribe for :: Angella





<SINAN CHOWDARY - Last Filed: 09/23/18 07:03>

## 2018-09-23 NOTE — RADIOLOGY REPORT (SQ)
EXAM DESCRIPTION:  CHEST SINGLE VIEW



COMPLETED DATE/TIME:  9/23/2018 7:26 am



REASON FOR STUDY:  Seizures



COMPARISON:  10/24/2017.



EXAM PARAMETERS:  NUMBER OF VIEWS: One view.

TECHNIQUE: Single frontal radiographic view of the chest acquired.

RADIATION DOSE: NA

LIMITATIONS: None.



FINDINGS:  LUNGS AND PLEURA: Airspace disease in the right lower lobe.  No large pleural effusion.  N
o pneumothorax.

MEDIASTINUM AND HILAR STRUCTURES: No masses.  Contour normal.

HEART AND VASCULAR STRUCTURES: Heart normal in size.  Normal vasculature.

BONES: No acute findings.

HARDWARE: None in the chest.

OTHER: No other significant finding.



IMPRESSION:  RIGHT LOWER LOBE AIRSPACE DISEASE CONCERNING FOR PNEUMONIA.



TECHNICAL DOCUMENTATION:  JOB ID:  [id number]

 2011 [employer]- All Rights Reserved



Reading location - IP/workstation name: [email or web address]

## 2018-09-23 NOTE — RADIOLOGY REPORT (SQ)
EXAM DESCRIPTION:  CT CHEST WITHOUT



COMPLETED DATE/TIME:  9/23/2018 1:46 pm



REASON FOR STUDY:  pneumonia



COMPARISON:  CT chest 8/18/2015, 10/16/2014



TECHNIQUE:  CT scan performed of the chest without intravenous contrast.  Images reviewed with lung, 
soft tissue and bone windows.  Reconstructed coronal and sagittal MPR images reviewed.  All images st
ored on PACS.

All CT scanners at this facility use dose modulation, iterative reconstruction, and/or weight based d
osing when appropriate to reduce radiation dose to as low as reasonably achievable (ALARA).

[facility]: Dose Right  [facility]: CareDose    [facility]: Dose Right    [facility]: Teradose 4D    [facility]: Smart Technologies



RADIATION DOSE:  CT Rad equipment meets quality standard of care and radiation dose reduction techniq
ues were employed. CTDIvol: 8.6 mGy. DLP: 336 mGy-cm. mGy.



LIMITATIONS:  No technical limitations.



FINDINGS:  LUNGS AND PLEURA: There is debris or mucus in the right mainstem bronchus on axial images 
23-26

Patchy diffuse airspace disease is now present in the right upper lobe, and right lower lobe worrisom
e for pneumonia

Minimal left patchy airspace disease superior segment lower lobe worrisome for pneumonia.

No pleural effusions.  No pneumothorax.

HILAR AND MEDIASTINAL STRUCTURES: Mild mediastinal adenopathy likely reactive

HEART AND VASCULAR STRUCTURES: No aneurysm.  No pericardial effusion.  Moderate coronary artery calci
fications

UPPER ABDOMEN: Hiatal hernia.  Tiny stones in the gallbladder.

THYROID AND OTHER SOFT TISSUES: No masses.  No adenopathy.

BONES: No significant finding.

HARDWARE: None in the chest.

OTHER: No other significant findings.



IMPRESSION:  Patchy bilateral airspace disease worrisome pneumonia.



TECHNICAL DOCUMENTATION:  JOB ID:  [id number]

Quality ID # [id number]: Final reports with documentation of one or more dose reduction techniques (e.g., Au
tomated exposure control, adjustment of the mA and/or kV according to patient size, use of iterative 
reconstruction technique)

 2011 [employer]- All Rights Reserved



Reading location - IP/workstation name: [email or web address]

## 2018-09-24 LAB
ADD MANUAL DIFF: NO
ALBUMIN SERPL-MCNC: 3.8 G/DL (ref 3.5–5)
ALP SERPL-CCNC: 118 U/L (ref 38–126)
ALT SERPL-CCNC: 7 U/L (ref 21–72)
ANION GAP SERPL CALC-SCNC: 12 MMOL/L (ref 5–19)
AST SERPL-CCNC: 16 U/L (ref 17–59)
BASOPHILS # BLD AUTO: 0 10^3/UL (ref 0–0.2)
BASOPHILS NFR BLD AUTO: 0.2 % (ref 0–2)
BILIRUB DIRECT SERPL-MCNC: 0.4 MG/DL (ref 0–0.4)
BILIRUB SERPL-MCNC: 0.8 MG/DL (ref 0.2–1.3)
BUN SERPL-MCNC: 12 MG/DL (ref 7–20)
CALCIUM: 9 MG/DL (ref 8.4–10.2)
CHLORIDE SERPL-SCNC: 103 MMOL/L (ref 98–107)
CO2 SERPL-SCNC: 25 MMOL/L (ref 22–30)
EOSINOPHIL # BLD AUTO: 0 10^3/UL (ref 0–0.6)
EOSINOPHIL NFR BLD AUTO: 0.1 % (ref 0–6)
ERYTHROCYTE [DISTWIDTH] IN BLOOD BY AUTOMATED COUNT: 14.3 % (ref 11.5–14)
GLUCOSE SERPL-MCNC: 119 MG/DL (ref 75–110)
HCT VFR BLD CALC: 37.2 % (ref 37.9–51)
HGB BLD-MCNC: 12 G/DL (ref 13.5–17)
LYMPHOCYTES # BLD AUTO: 1.2 10^3/UL (ref 0.5–4.7)
LYMPHOCYTES NFR BLD AUTO: 10.1 % (ref 13–45)
MCH RBC QN AUTO: 28.3 PG (ref 27–33.4)
MCHC RBC AUTO-ENTMCNC: 32.2 G/DL (ref 32–36)
MCV RBC AUTO: 88 FL (ref 80–97)
MONOCYTES # BLD AUTO: 0.6 10^3/UL (ref 0.1–1.4)
MONOCYTES NFR BLD AUTO: 5.3 % (ref 3–13)
NEUTROPHILS # BLD AUTO: 10.1 10^3/UL (ref 1.7–8.2)
NEUTS SEG NFR BLD AUTO: 84.3 % (ref 42–78)
PLATELET # BLD: 204 10^3/UL (ref 150–450)
POTASSIUM SERPL-SCNC: 4.3 MMOL/L (ref 3.6–5)
PROT SERPL-MCNC: 7.5 G/DL (ref 6.3–8.2)
RBC # BLD AUTO: 4.23 10^6/UL (ref 4.35–5.55)
SODIUM SERPL-SCNC: 140.1 MMOL/L (ref 137–145)
TOTAL CELLS COUNTED % (AUTO): 100 %
WBC # BLD AUTO: 12 10^3/UL (ref 4–10.5)

## 2018-09-24 RX ADMIN — PHENOBARBITAL SCH MG: 32.4 TABLET ORAL at 21:31

## 2018-09-24 RX ADMIN — PIPERACILLIN AND TAZOBACTAM SCH: 3; .375 INJECTION, POWDER, LYOPHILIZED, FOR SOLUTION INTRAVENOUS; PARENTERAL at 05:30

## 2018-09-24 RX ADMIN — Medication SCH: at 21:45

## 2018-09-24 RX ADMIN — PIPERACILLIN AND TAZOBACTAM SCH MLS/HR: 3; .375 INJECTION, POWDER, LYOPHILIZED, FOR SOLUTION INTRAVENOUS; PARENTERAL at 18:36

## 2018-09-24 RX ADMIN — IPRATROPIUM BROMIDE AND ALBUTEROL SULFATE SCH ML: 2.5; .5 SOLUTION RESPIRATORY (INHALATION) at 16:14

## 2018-09-24 RX ADMIN — SODIUM CHLORIDE PRN MLS/HR: 9 INJECTION, SOLUTION INTRAVENOUS at 21:31

## 2018-09-24 RX ADMIN — LISINOPRIL SCH MG: 10 TABLET ORAL at 09:46

## 2018-09-24 RX ADMIN — PHENOBARBITAL SCH MG: 32.4 TABLET ORAL at 09:46

## 2018-09-24 RX ADMIN — GABAPENTIN SCH MG: 300 CAPSULE ORAL at 06:13

## 2018-09-24 RX ADMIN — IPRATROPIUM BROMIDE AND ALBUTEROL SULFATE SCH ML: 2.5; .5 SOLUTION RESPIRATORY (INHALATION) at 00:24

## 2018-09-24 RX ADMIN — LEVOFLOXACIN SCH MLS/HR: 750 INJECTION, SOLUTION INTRAVENOUS at 09:46

## 2018-09-24 RX ADMIN — SIMVASTATIN SCH MG: 10 TABLET, FILM COATED ORAL at 21:31

## 2018-09-24 RX ADMIN — Medication SCH: at 05:48

## 2018-09-24 RX ADMIN — GABAPENTIN SCH MG: 300 CAPSULE ORAL at 21:31

## 2018-09-24 RX ADMIN — GABAPENTIN SCH MG: 300 CAPSULE ORAL at 14:22

## 2018-09-24 RX ADMIN — IPRATROPIUM BROMIDE AND ALBUTEROL SULFATE SCH ML: 2.5; .5 SOLUTION RESPIRATORY (INHALATION) at 19:49

## 2018-09-24 RX ADMIN — ENOXAPARIN SODIUM SCH MG: 40 INJECTION SUBCUTANEOUS at 09:46

## 2018-09-24 RX ADMIN — IPRATROPIUM BROMIDE AND ALBUTEROL SULFATE SCH ML: 2.5; .5 SOLUTION RESPIRATORY (INHALATION) at 11:28

## 2018-09-24 RX ADMIN — ASPIRIN SCH MG: 81 TABLET, COATED ORAL at 09:46

## 2018-09-24 RX ADMIN — PIPERACILLIN AND TAZOBACTAM SCH MLS/HR: 3; .375 INJECTION, POWDER, LYOPHILIZED, FOR SOLUTION INTRAVENOUS; PARENTERAL at 13:17

## 2018-09-24 RX ADMIN — IPRATROPIUM BROMIDE AND ALBUTEROL SULFATE SCH ML: 2.5; .5 SOLUTION RESPIRATORY (INHALATION) at 07:47

## 2018-09-24 RX ADMIN — Medication SCH ML: at 14:24

## 2018-09-24 RX ADMIN — IPRATROPIUM BROMIDE AND ALBUTEROL SULFATE SCH ML: 2.5; .5 SOLUTION RESPIRATORY (INHALATION) at 03:59

## 2018-09-24 RX ADMIN — PIPERACILLIN AND TAZOBACTAM SCH MLS/HR: 3; .375 INJECTION, POWDER, LYOPHILIZED, FOR SOLUTION INTRAVENOUS; PARENTERAL at 05:40

## 2018-09-24 RX ADMIN — SODIUM CHLORIDE PRN MLS/HR: 9 INJECTION, SOLUTION INTRAVENOUS at 05:47

## 2018-09-24 RX ADMIN — IPRATROPIUM BROMIDE AND ALBUTEROL SULFATE SCH ML: 2.5; .5 SOLUTION RESPIRATORY (INHALATION) at 23:55

## 2018-09-25 LAB
ADD MANUAL DIFF: NO
ALBUMIN SERPL-MCNC: 3.2 G/DL (ref 3.5–5)
ALP SERPL-CCNC: 97 U/L (ref 38–126)
ALT SERPL-CCNC: 16 U/L (ref 21–72)
ANION GAP SERPL CALC-SCNC: 8 MMOL/L (ref 5–19)
AST SERPL-CCNC: 15 U/L (ref 17–59)
BASOPHILS # BLD AUTO: 0 10^3/UL (ref 0–0.2)
BASOPHILS NFR BLD AUTO: 0.4 % (ref 0–2)
BILIRUB DIRECT SERPL-MCNC: 0.6 MG/DL (ref 0–0.4)
BILIRUB SERPL-MCNC: 0.6 MG/DL (ref 0.2–1.3)
BUN SERPL-MCNC: 14 MG/DL (ref 7–20)
CALCIUM: 8.5 MG/DL (ref 8.4–10.2)
CHLORIDE SERPL-SCNC: 108 MMOL/L (ref 98–107)
CO2 SERPL-SCNC: 24 MMOL/L (ref 22–30)
EOSINOPHIL # BLD AUTO: 0.1 10^3/UL (ref 0–0.6)
EOSINOPHIL NFR BLD AUTO: 1.2 % (ref 0–6)
ERYTHROCYTE [DISTWIDTH] IN BLOOD BY AUTOMATED COUNT: 14.1 % (ref 11.5–14)
GLUCOSE SERPL-MCNC: 107 MG/DL (ref 75–110)
HCT VFR BLD CALC: 32.9 % (ref 37.9–51)
HGB BLD-MCNC: 10.7 G/DL (ref 13.5–17)
LYMPHOCYTES # BLD AUTO: 1.3 10^3/UL (ref 0.5–4.7)
LYMPHOCYTES NFR BLD AUTO: 19.8 % (ref 13–45)
MCH RBC QN AUTO: 28.3 PG (ref 27–33.4)
MCHC RBC AUTO-ENTMCNC: 32.6 G/DL (ref 32–36)
MCV RBC AUTO: 87 FL (ref 80–97)
MONOCYTES # BLD AUTO: 0.5 10^3/UL (ref 0.1–1.4)
MONOCYTES NFR BLD AUTO: 8.2 % (ref 3–13)
NEUTROPHILS # BLD AUTO: 4.7 10^3/UL (ref 1.7–8.2)
NEUTS SEG NFR BLD AUTO: 70.4 % (ref 42–78)
PLATELET # BLD: 196 10^3/UL (ref 150–450)
POTASSIUM SERPL-SCNC: 4.3 MMOL/L (ref 3.6–5)
PROT SERPL-MCNC: 6.6 G/DL (ref 6.3–8.2)
RBC # BLD AUTO: 3.79 10^6/UL (ref 4.35–5.55)
SODIUM SERPL-SCNC: 140.2 MMOL/L (ref 137–145)
TOTAL CELLS COUNTED % (AUTO): 100 %
WBC # BLD AUTO: 6.7 10^3/UL (ref 4–10.5)

## 2018-09-25 RX ADMIN — IPRATROPIUM BROMIDE AND ALBUTEROL SULFATE SCH ML: 2.5; .5 SOLUTION RESPIRATORY (INHALATION) at 20:37

## 2018-09-25 RX ADMIN — Medication SCH ML: at 12:59

## 2018-09-25 RX ADMIN — EXTENDED PHENYTOIN SODIUM SCH MG: 100 CAPSULE ORAL at 12:59

## 2018-09-25 RX ADMIN — IPRATROPIUM BROMIDE AND ALBUTEROL SULFATE SCH ML: 2.5; .5 SOLUTION RESPIRATORY (INHALATION) at 11:55

## 2018-09-25 RX ADMIN — PIPERACILLIN AND TAZOBACTAM SCH MLS/HR: 3; .375 INJECTION, POWDER, LYOPHILIZED, FOR SOLUTION INTRAVENOUS; PARENTERAL at 18:07

## 2018-09-25 RX ADMIN — PHENOBARBITAL SCH MG: 32.4 TABLET ORAL at 10:35

## 2018-09-25 RX ADMIN — GABAPENTIN SCH MG: 300 CAPSULE ORAL at 21:38

## 2018-09-25 RX ADMIN — LEVOFLOXACIN SCH MLS/HR: 750 INJECTION, SOLUTION INTRAVENOUS at 10:35

## 2018-09-25 RX ADMIN — ASPIRIN SCH MG: 81 TABLET, COATED ORAL at 10:35

## 2018-09-25 RX ADMIN — GABAPENTIN SCH MG: 300 CAPSULE ORAL at 12:59

## 2018-09-25 RX ADMIN — PIPERACILLIN AND TAZOBACTAM SCH MLS/HR: 3; .375 INJECTION, POWDER, LYOPHILIZED, FOR SOLUTION INTRAVENOUS; PARENTERAL at 12:58

## 2018-09-25 RX ADMIN — PIPERACILLIN AND TAZOBACTAM SCH MLS/HR: 3; .375 INJECTION, POWDER, LYOPHILIZED, FOR SOLUTION INTRAVENOUS; PARENTERAL at 00:49

## 2018-09-25 RX ADMIN — Medication SCH ML: at 21:38

## 2018-09-25 RX ADMIN — LISINOPRIL SCH MG: 10 TABLET ORAL at 10:35

## 2018-09-25 RX ADMIN — IPRATROPIUM BROMIDE AND ALBUTEROL SULFATE SCH ML: 2.5; .5 SOLUTION RESPIRATORY (INHALATION) at 08:22

## 2018-09-25 RX ADMIN — GABAPENTIN SCH MG: 300 CAPSULE ORAL at 05:09

## 2018-09-25 RX ADMIN — PIPERACILLIN AND TAZOBACTAM SCH MLS/HR: 3; .375 INJECTION, POWDER, LYOPHILIZED, FOR SOLUTION INTRAVENOUS; PARENTERAL at 05:09

## 2018-09-25 RX ADMIN — IPRATROPIUM BROMIDE AND ALBUTEROL SULFATE SCH: 2.5; .5 SOLUTION RESPIRATORY (INHALATION) at 03:25

## 2018-09-25 RX ADMIN — EXTENDED PHENYTOIN SODIUM SCH MG: 100 CAPSULE ORAL at 21:39

## 2018-09-25 RX ADMIN — ENOXAPARIN SODIUM SCH MG: 40 INJECTION SUBCUTANEOUS at 10:35

## 2018-09-25 RX ADMIN — EXTENDED PHENYTOIN SODIUM SCH MG: 100 CAPSULE ORAL at 10:34

## 2018-09-25 RX ADMIN — SIMVASTATIN SCH MG: 10 TABLET, FILM COATED ORAL at 21:38

## 2018-09-25 RX ADMIN — SODIUM CHLORIDE PRN MLS/HR: 9 INJECTION, SOLUTION INTRAVENOUS at 18:08

## 2018-09-25 RX ADMIN — IPRATROPIUM BROMIDE AND ALBUTEROL SULFATE SCH ML: 2.5; .5 SOLUTION RESPIRATORY (INHALATION) at 15:59

## 2018-09-25 RX ADMIN — SODIUM CHLORIDE PRN MLS/HR: 9 INJECTION, SOLUTION INTRAVENOUS at 06:18

## 2018-09-25 RX ADMIN — Medication SCH ML: at 05:10

## 2018-09-25 RX ADMIN — PHENOBARBITAL SCH MG: 32.4 TABLET ORAL at 21:38

## 2018-09-25 NOTE — PDOC PROGRESS REPORT
Subjective


Progress Note for:: 09/25/18


Subjective:: 





Patient reported improvement in his breathing and using bedside incentive 

spirometer and flutter devices. No chest pain. No abdominal pain, diarrhea, 

nausea, or vomiting. No fever or chills.


Reason For Visit: 


SEVERE BILATERAL PNEUMONIA,H/O SEIZURE,DILANTIN








Physical Exam


Vital Signs: 


 











Temp Pulse Resp BP Pulse Ox


 


 98.0 F   60   14   133/63 H  100 


 


 09/25/18 15:44  09/25/18 15:59  09/25/18 15:59  09/25/18 15:44  09/25/18 15:59








 Intake & Output











 09/24/18 09/25/18 09/26/18





 06:59 06:59 06:59


 


Intake Total 1350 3737 1864


 


Output Total  1325 375


 


Balance 1350 2412 1489


 


Weight 86.2 kg 83.2 kg 











Physical Exam: 


General appearance: PRESENT: no acute distress 


Head exam: PRESENT: atraumatic, normocephalic


Eye exam: PRESENT: conjunctiva pink, EOMI, PERRLA.  ABSENT: scleral icterus


Ear exam: PRESENT: normal external ear exam


Mouth exam: PRESENT: moist, tongue midline


Respiratory exam: PRESENT: crackles - right lung field improved with manual 

percussion manuver., decreased breath sounds


Cardiovascular exam: PRESENT: RRR.  ABSENT: diastolic murmur, rubs, systolic 

murmur


Vascular exam: ABSENT: pallor


GI/Abdominal exam: PRESENT: normal bowel sounds, soft.  ABSENT: distended, 

guarding, mass, organomegaly, rebound, tenderness


Extremities exam: ABSENT: pedal edema


Musculoskeletal exam: PRESENT: normal inspection


Neurological exam: PRESENT: alert, awake, oriented to person, oriented to place

, oriented to time, oriented to situation, CN II-XII grossly intact.  ABSENT: 

motor sensory deficit


Skin exam: PRESENT: dry, intact, warm.  ABSENT: cyanosis, rash








Results


Laboratory Results: 


 





 09/25/18 06:00 





 09/25/18 06:00 





 











  09/25/18 09/25/18





  06:00 06:00


 


WBC  6.7 


 


RBC  3.79 L 


 


Hgb  10.7 L 


 


Hct  32.9 L 


 


MCV  87 


 


MCH  28.3 


 


MCHC  32.6 


 


RDW  14.1 H 


 


Plt Count  196 


 


Seg Neutrophils %  70.4 


 


Lymphocytes %  19.8 


 


Monocytes %  8.2 


 


Eosinophils %  1.2 


 


Basophils %  0.4 


 


Absolute Neutrophils  4.7 


 


Absolute Lymphocytes  1.3 


 


Absolute Monocytes  0.5 


 


Absolute Eosinophils  0.1 


 


Absolute Basophils  0.0 


 


Sodium   140.2


 


Potassium   4.3


 


Chloride   108 H


 


Carbon Dioxide   24


 


Anion Gap   8


 


BUN   14


 


Creatinine   1.00


 


Est GFR ( Amer)   > 60


 


Est GFR (Non-Af Amer)   > 60


 


Glucose   107


 


Calcium   8.5


 


Total Bilirubin   0.6


 


AST   15 L


 


ALT   16 L


 


Alkaline Phosphatase   97


 


Total Protein   6.6


 


Albumin   3.2 L








 











  09/23/18 09/23/18





  16:20 16:20


 


Creatine Kinase  93 


 


CK-MB (CK-2)   0.62


 


Troponin I   0.066


 


NT-Pro-B Natriuret Pep   1040 H











Impressions: 


 





Chest CT  09/23/18 00:00


IMPRESSION:  Patchy bilateral airspace disease worrisome pneumonia.


 








Chest X-Ray  09/23/18 06:53


IMPRESSION:  RIGHT LOWER LOBE AIRSPACE DISEASE CONCERNING FOR PNEUMONIA.


 














Assessment & Plan





- Diagnosis


(1) Altered mental status


Qualifiers: 


   Altered mental status type: unspecified   Qualified Code(s): R41.82 - 

Altered mental status, unspecified   


Is this a current diagnosis for this admission?: Yes   


Plan: 


Improved.








(2) Acute hypoxemic respiratory failure


Is this a current diagnosis for this admission?: Yes   





(3) Aspiration pneumonia


Qualifiers: 


   Aspiration pneumonia type: unspecified   Laterality: bilateral   Lung 

location: unspecified part of lung   Qualified Code(s): J69.0 - Pneumonitis due 

to inhalation of food and vomit   


Is this a current diagnosis for this admission?: Yes   


Plan: 


Continue IV antibiotic coverage. Decrease IV fluid rate to 75 mL/hr. Encouraged 

use of bedside devices.








(4) Dilantin toxicity


Qualifiers: 


   Encounter type: initial encounter   Injury intent: undetermined intent   

Qualified Code(s): T42.0X4A - Poisoning by hydantoin derivatives, undetermined, 

initial encounter   


Is this a current diagnosis for this admission?: Yes   


Plan: 


Resume therapy with lower daily total dosge to 300 mg at 100 mg q8 hours.








(5) Epilepsy


Qualifiers: 


   Epilepsy type: unspecified   Intractability: not intractable   Status 

epilepticus: without status epilepticus   Qualified Code(s): G40.909 - Epilepsy

, unspecified, not intractable, without status epilepticus   


Is this a current diagnosis for this admission?: Yes   


Plan: 


See attending physician orders.








- Time


Time Spent with patient: 25-34 minutes


Medications reviewed and adjusted accordingly: Yes


Anticipated discharge: Home


Within: Other





- Inpatient Certification


Based on my medical assessment, after consideration of the patient's 

comorbidities, presenting symptoms, or acuity I expect that the services needed 

warrant INPATIENT care.: Yes


I certify that my determination is in accordance with my understanding of 

Medicare's requirements for reasonable and necessary INPATIENT services [42 CFR 

412.3e].: Yes


Medical Necessity: Need Close Monitoring Due to Risk of Patient Decompensation, 

Need For IV Fluids, Need For Continuous Telemetry Monitoring, Need for IV 

Antibiotics


Post Hospital Care: D/C Planner Documentation





- Plan Summary


Plan Summary: 





See attending physician orders.

## 2018-09-26 LAB
ADD MANUAL DIFF: NO
ALBUMIN SERPL-MCNC: 3.1 G/DL (ref 3.5–5)
ALP SERPL-CCNC: 89 U/L (ref 38–126)
ALT SERPL-CCNC: 21 U/L (ref 21–72)
ANION GAP SERPL CALC-SCNC: 8 MMOL/L (ref 5–19)
AST SERPL-CCNC: 25 U/L (ref 17–59)
BASOPHILS # BLD AUTO: 0 10^3/UL (ref 0–0.2)
BASOPHILS NFR BLD AUTO: 0.4 % (ref 0–2)
BILIRUB DIRECT SERPL-MCNC: 0.5 MG/DL (ref 0–0.4)
BILIRUB SERPL-MCNC: 0.5 MG/DL (ref 0.2–1.3)
BUN SERPL-MCNC: 12 MG/DL (ref 7–20)
CALCIUM: 9 MG/DL (ref 8.4–10.2)
CHLORIDE SERPL-SCNC: 107 MMOL/L (ref 98–107)
CO2 SERPL-SCNC: 24 MMOL/L (ref 22–30)
EOSINOPHIL # BLD AUTO: 0.2 10^3/UL (ref 0–0.6)
EOSINOPHIL NFR BLD AUTO: 4 % (ref 0–6)
ERYTHROCYTE [DISTWIDTH] IN BLOOD BY AUTOMATED COUNT: 14.3 % (ref 11.5–14)
GLUCOSE SERPL-MCNC: 106 MG/DL (ref 75–110)
HCT VFR BLD CALC: 33.7 % (ref 37.9–51)
HGB BLD-MCNC: 11.1 G/DL (ref 13.5–17)
LYMPHOCYTES # BLD AUTO: 1.2 10^3/UL (ref 0.5–4.7)
LYMPHOCYTES NFR BLD AUTO: 23.3 % (ref 13–45)
MCH RBC QN AUTO: 28.3 PG (ref 27–33.4)
MCHC RBC AUTO-ENTMCNC: 32.8 G/DL (ref 32–36)
MCV RBC AUTO: 86 FL (ref 80–97)
MONOCYTES # BLD AUTO: 0.5 10^3/UL (ref 0.1–1.4)
MONOCYTES NFR BLD AUTO: 8.5 % (ref 3–13)
NEUTROPHILS # BLD AUTO: 3.4 10^3/UL (ref 1.7–8.2)
NEUTS SEG NFR BLD AUTO: 63.8 % (ref 42–78)
PLATELET # BLD: 222 10^3/UL (ref 150–450)
POTASSIUM SERPL-SCNC: 4.1 MMOL/L (ref 3.6–5)
PROT SERPL-MCNC: 6.5 G/DL (ref 6.3–8.2)
RBC # BLD AUTO: 3.9 10^6/UL (ref 4.35–5.55)
SODIUM SERPL-SCNC: 139.2 MMOL/L (ref 137–145)
TOTAL CELLS COUNTED % (AUTO): 100 %
WBC # BLD AUTO: 5.3 10^3/UL (ref 4–10.5)

## 2018-09-26 RX ADMIN — EXTENDED PHENYTOIN SODIUM SCH MG: 100 CAPSULE ORAL at 05:29

## 2018-09-26 RX ADMIN — Medication SCH ML: at 14:56

## 2018-09-26 RX ADMIN — Medication SCH ML: at 05:30

## 2018-09-26 RX ADMIN — IPRATROPIUM BROMIDE AND ALBUTEROL SULFATE SCH ML: 2.5; .5 SOLUTION RESPIRATORY (INHALATION) at 20:10

## 2018-09-26 RX ADMIN — IPRATROPIUM BROMIDE AND ALBUTEROL SULFATE SCH ML: 2.5; .5 SOLUTION RESPIRATORY (INHALATION) at 08:55

## 2018-09-26 RX ADMIN — IPRATROPIUM BROMIDE AND ALBUTEROL SULFATE SCH ML: 2.5; .5 SOLUTION RESPIRATORY (INHALATION) at 00:15

## 2018-09-26 RX ADMIN — GABAPENTIN SCH MG: 300 CAPSULE ORAL at 05:29

## 2018-09-26 RX ADMIN — LEVOFLOXACIN SCH MLS/HR: 750 INJECTION, SOLUTION INTRAVENOUS at 09:23

## 2018-09-26 RX ADMIN — GABAPENTIN SCH MG: 300 CAPSULE ORAL at 14:56

## 2018-09-26 RX ADMIN — Medication SCH ML: at 21:37

## 2018-09-26 RX ADMIN — PIPERACILLIN AND TAZOBACTAM SCH MLS/HR: 3; .375 INJECTION, POWDER, LYOPHILIZED, FOR SOLUTION INTRAVENOUS; PARENTERAL at 05:30

## 2018-09-26 RX ADMIN — PHENOBARBITAL SCH MG: 32.4 TABLET ORAL at 09:23

## 2018-09-26 RX ADMIN — SIMVASTATIN SCH MG: 10 TABLET, FILM COATED ORAL at 21:37

## 2018-09-26 RX ADMIN — EXTENDED PHENYTOIN SODIUM SCH MG: 100 CAPSULE ORAL at 14:56

## 2018-09-26 RX ADMIN — ASPIRIN SCH MG: 81 TABLET, COATED ORAL at 09:22

## 2018-09-26 RX ADMIN — GABAPENTIN SCH MG: 300 CAPSULE ORAL at 21:37

## 2018-09-26 RX ADMIN — PHENOBARBITAL SCH MG: 32.4 TABLET ORAL at 21:37

## 2018-09-26 RX ADMIN — SODIUM CHLORIDE PRN MLS/HR: 9 INJECTION, SOLUTION INTRAVENOUS at 09:25

## 2018-09-26 RX ADMIN — EXTENDED PHENYTOIN SODIUM SCH MG: 100 CAPSULE ORAL at 21:37

## 2018-09-26 RX ADMIN — ENOXAPARIN SODIUM SCH MG: 40 INJECTION SUBCUTANEOUS at 09:23

## 2018-09-26 RX ADMIN — IPRATROPIUM BROMIDE AND ALBUTEROL SULFATE SCH ML: 2.5; .5 SOLUTION RESPIRATORY (INHALATION) at 11:44

## 2018-09-26 RX ADMIN — IPRATROPIUM BROMIDE AND ALBUTEROL SULFATE SCH ML: 2.5; .5 SOLUTION RESPIRATORY (INHALATION) at 16:06

## 2018-09-26 RX ADMIN — IPRATROPIUM BROMIDE AND ALBUTEROL SULFATE SCH ML: 2.5; .5 SOLUTION RESPIRATORY (INHALATION) at 04:09

## 2018-09-26 RX ADMIN — PIPERACILLIN AND TAZOBACTAM SCH MLS/HR: 3; .375 INJECTION, POWDER, LYOPHILIZED, FOR SOLUTION INTRAVENOUS; PARENTERAL at 01:06

## 2018-09-26 RX ADMIN — PIPERACILLIN AND TAZOBACTAM SCH MLS/HR: 3; .375 INJECTION, POWDER, LYOPHILIZED, FOR SOLUTION INTRAVENOUS; PARENTERAL at 17:37

## 2018-09-26 RX ADMIN — PIPERACILLIN AND TAZOBACTAM SCH MLS/HR: 3; .375 INJECTION, POWDER, LYOPHILIZED, FOR SOLUTION INTRAVENOUS; PARENTERAL at 12:09

## 2018-09-26 RX ADMIN — LISINOPRIL SCH MG: 10 TABLET ORAL at 09:22

## 2018-09-26 NOTE — PDOC PROGRESS REPORT
Subjective


Progress Note for:: 09/26/18


Subjective:: 


No chest pain or difficulty with breathing. No abdominal pain, diarrhea, nausea

, or vomiting. No fever or chills.


Reason For Visit: 


SEVERE BILATERAL PNEUMONIA,H/O SEIZURE,DILANTIN








Physical Exam


Vital Signs: 


 











Temp Pulse Resp BP Pulse Ox


 


 98.4 F   66   16   134/70 H  97 


 


 09/26/18 14:53  09/26/18 16:06  09/26/18 16:06  09/26/18 14:53  09/26/18 16:06








 Intake & Output











 09/25/18 09/26/18 09/27/18





 06:59 06:59 06:59


 


Intake Total 3737 2064 1272


 


Output Total 7116 470 4708


 


Balance 2412 1439 72


 


Weight 83.2 kg 91.1 kg 











Physical Exam: 


General appearance: PRESENT: no acute distress 


Head exam: PRESENT: atraumatic, normocephalic


Eye exam: PRESENT: conjunctiva pink, EOMI, PERRLA.  ABSENT: scleral icterus


Ear exam: PRESENT: normal external ear exam


Mouth exam: PRESENT: moist, tongue midline


Respiratory exam: PRESENT: clear to auscultation, improved lower lung zone 

breath sounds


Cardiovascular exam: PRESENT: RRR.  ABSENT: diastolic murmur, rubs, systolic 

murmur


Vascular exam: ABSENT: pallor


GI/Abdominal exam: PRESENT: normal bowel sounds, soft.  ABSENT: distended, 

guarding, mass, organomegaly, rebound, tenderness


Extremities exam: ABSENT: pedal edema


Musculoskeletal exam: PRESENT: normal inspection


Neurological exam: PRESENT: alert, awake, oriented to person, oriented to place

, oriented to time, oriented to situation, CN II-XII grossly intact.  ABSENT: 

motor sensory deficit


Skin exam: PRESENT: dry, intact, warm.  ABSENT: cyanosis, rash





Results


Laboratory Results: 


 





 09/26/18 05:41 





 09/26/18 05:41 





 











  09/26/18 09/26/18





  05:41 05:41


 


WBC  5.3 


 


RBC  3.90 L 


 


Hgb  11.1 L 


 


Hct  33.7 L 


 


MCV  86 


 


MCH  28.3 


 


MCHC  32.8 


 


RDW  14.3 H 


 


Plt Count  222 


 


Seg Neutrophils %  63.8 


 


Lymphocytes %  23.3 


 


Monocytes %  8.5 


 


Eosinophils %  4.0 


 


Basophils %  0.4 


 


Absolute Neutrophils  3.4 


 


Absolute Lymphocytes  1.2 


 


Absolute Monocytes  0.5 


 


Absolute Eosinophils  0.2 


 


Absolute Basophils  0.0 


 


Sodium   139.2


 


Potassium   4.1


 


Chloride   107


 


Carbon Dioxide   24


 


Anion Gap   8


 


BUN   12


 


Creatinine   0.97


 


Est GFR ( Amer)   > 60


 


Est GFR (Non-Af Amer)   > 60


 


Glucose   106


 


Calcium   9.0


 


Total Bilirubin   0.5


 


AST   25


 


ALT   21


 


Alkaline Phosphatase   89


 


Total Protein   6.5


 


Albumin   3.1 L








 





09/25/18 18:15   Sputum   Gram Stain - Final


09/25/18 18:15   Sputum   Sputum Culture - Final


                            Yeast, Not Candida Albicans


                            Normal Leonie Absent





 











  09/23/18 09/23/18





  16:20 16:20


 


Creatine Kinase  93 


 


CK-MB (CK-2)   0.62


 


Troponin I   0.066


 


NT-Pro-B Natriuret Pep   1040 H











Impressions: 


 





Chest CT  09/23/18 00:00


IMPRESSION:  Patchy bilateral airspace disease worrisome pneumonia.


 








Chest X-Ray  09/23/18 06:53


IMPRESSION:  RIGHT LOWER LOBE AIRSPACE DISEASE CONCERNING FOR PNEUMONIA.


 














Assessment & Plan





- Diagnosis


(1) Aspiration pneumonia


Qualifiers: 


   Aspiration pneumonia type: unspecified   Laterality: bilateral   Lung 

location: unspecified part of lung   Qualified Code(s): J69.0 - Pneumonitis due 

to inhalation of food and vomit   


Is this a current diagnosis for this admission?: Yes   


Plan: 


Continue IV antibiotic coverage. Encouraged intermittent use of bedside devices.








(2) Altered mental status


Qualifiers: 


   Altered mental status type: unspecified   Qualified Code(s): R41.82 - 

Altered mental status, unspecified   


Is this a current diagnosis for this admission?: Yes   


Plan: 


Resolved.








(3) Acute hypoxemic respiratory failure


Is this a current diagnosis for this admission?: Yes   


Plan: 


Resolved.








(4) Dilantin toxicity


Qualifiers: 


   Encounter type: initial encounter   Injury intent: undetermined intent   

Qualified Code(s): T42.0X4A - Poisoning by hydantoin derivatives, undetermined, 

initial encounter   


Is this a current diagnosis for this admission?: Yes   


Plan: 


Resolved.








(5) Epilepsy


Qualifiers: 


   Epilepsy type: unspecified   Intractability: not intractable   Status 

epilepticus: without status epilepticus   Qualified Code(s): G40.909 - Epilepsy

, unspecified, not intractable, without status epilepticus   


Is this a current diagnosis for this admission?: Yes   


Plan: 


Maintain on Dilantin dosing at 100mg p.o tid and Phenobarbital therapy for 

seizure management.








- Time


Time Spent with patient: 25-34 minutes


Medications reviewed and adjusted accordingly: Yes


Anticipated discharge: Home


Within: Other





- Inpatient Certification


Based on my medical assessment, after consideration of the patient's 

comorbidities, presenting symptoms, or acuity I expect that the services needed 

warrant INPATIENT care.: Yes


I certify that my determination is in accordance with my understanding of 

Medicare's requirements for reasonable and necessary INPATIENT services [42 CFR 

412.3e].: Yes


Medical Necessity: Need Close Monitoring Due to Risk of Patient Decompensation, 

Need For IV Fluids, Need For Continuous Telemetry Monitoring, Need for IV 

Antibiotics, Risk of Complication if Not Cared For in Hospital


Post Hospital Care: D/C Planner Documentation





- Plan Summary


Plan Summary: 





See attending physician orders.

## 2018-09-27 LAB
ADD MANUAL DIFF: NO
BASOPHILS # BLD AUTO: 0 10^3/UL (ref 0–0.2)
BASOPHILS NFR BLD AUTO: 0.9 % (ref 0–2)
EOSINOPHIL # BLD AUTO: 0.2 10^3/UL (ref 0–0.6)
EOSINOPHIL NFR BLD AUTO: 4.8 % (ref 0–6)
ERYTHROCYTE [DISTWIDTH] IN BLOOD BY AUTOMATED COUNT: 13.9 % (ref 11.5–14)
HCT VFR BLD CALC: 33.3 % (ref 37.9–51)
HGB BLD-MCNC: 10.8 G/DL (ref 13.5–17)
LYMPHOCYTES # BLD AUTO: 1.2 10^3/UL (ref 0.5–4.7)
LYMPHOCYTES NFR BLD AUTO: 27 % (ref 13–45)
MCH RBC QN AUTO: 28.2 PG (ref 27–33.4)
MCHC RBC AUTO-ENTMCNC: 32.4 G/DL (ref 32–36)
MCV RBC AUTO: 87 FL (ref 80–97)
MONOCYTES # BLD AUTO: 0.5 10^3/UL (ref 0.1–1.4)
MONOCYTES NFR BLD AUTO: 10.8 % (ref 3–13)
NEUTROPHILS # BLD AUTO: 2.6 10^3/UL (ref 1.7–8.2)
NEUTS SEG NFR BLD AUTO: 56.5 % (ref 42–78)
PLATELET # BLD: 254 10^3/UL (ref 150–450)
RBC # BLD AUTO: 3.83 10^6/UL (ref 4.35–5.55)
TOTAL CELLS COUNTED % (AUTO): 100 %
WBC # BLD AUTO: 4.6 10^3/UL (ref 4–10.5)

## 2018-09-27 RX ADMIN — PHENOBARBITAL SCH MG: 32.4 TABLET ORAL at 22:06

## 2018-09-27 RX ADMIN — PHENOBARBITAL SCH MG: 32.4 TABLET ORAL at 10:19

## 2018-09-27 RX ADMIN — EXTENDED PHENYTOIN SODIUM SCH MG: 100 CAPSULE ORAL at 22:06

## 2018-09-27 RX ADMIN — PIPERACILLIN AND TAZOBACTAM SCH MLS/HR: 3; .375 INJECTION, POWDER, LYOPHILIZED, FOR SOLUTION INTRAVENOUS; PARENTERAL at 12:04

## 2018-09-27 RX ADMIN — IPRATROPIUM BROMIDE AND ALBUTEROL SULFATE SCH ML: 2.5; .5 SOLUTION RESPIRATORY (INHALATION) at 00:13

## 2018-09-27 RX ADMIN — GABAPENTIN SCH MG: 300 CAPSULE ORAL at 13:19

## 2018-09-27 RX ADMIN — PIPERACILLIN AND TAZOBACTAM SCH MLS/HR: 3; .375 INJECTION, POWDER, LYOPHILIZED, FOR SOLUTION INTRAVENOUS; PARENTERAL at 18:21

## 2018-09-27 RX ADMIN — EXTENDED PHENYTOIN SODIUM SCH MG: 100 CAPSULE ORAL at 13:19

## 2018-09-27 RX ADMIN — IPRATROPIUM BROMIDE AND ALBUTEROL SULFATE SCH: 2.5; .5 SOLUTION RESPIRATORY (INHALATION) at 14:11

## 2018-09-27 RX ADMIN — IPRATROPIUM BROMIDE AND ALBUTEROL SULFATE SCH ML: 2.5; .5 SOLUTION RESPIRATORY (INHALATION) at 08:38

## 2018-09-27 RX ADMIN — IPRATROPIUM BROMIDE AND ALBUTEROL SCH PUFF: 20; 100 SPRAY, METERED RESPIRATORY (INHALATION) at 18:18

## 2018-09-27 RX ADMIN — GABAPENTIN SCH MG: 300 CAPSULE ORAL at 05:15

## 2018-09-27 RX ADMIN — Medication SCH: at 13:21

## 2018-09-27 RX ADMIN — PIPERACILLIN AND TAZOBACTAM SCH MLS/HR: 3; .375 INJECTION, POWDER, LYOPHILIZED, FOR SOLUTION INTRAVENOUS; PARENTERAL at 05:19

## 2018-09-27 RX ADMIN — LEVOFLOXACIN SCH MLS/HR: 750 INJECTION, SOLUTION INTRAVENOUS at 10:20

## 2018-09-27 RX ADMIN — ASPIRIN SCH MG: 81 TABLET, COATED ORAL at 10:20

## 2018-09-27 RX ADMIN — SODIUM CHLORIDE PRN MLS/HR: 9 INJECTION, SOLUTION INTRAVENOUS at 18:36

## 2018-09-27 RX ADMIN — PIPERACILLIN AND TAZOBACTAM SCH MLS/HR: 3; .375 INJECTION, POWDER, LYOPHILIZED, FOR SOLUTION INTRAVENOUS; PARENTERAL at 00:21

## 2018-09-27 RX ADMIN — IPRATROPIUM BROMIDE AND ALBUTEROL SULFATE SCH ML: 2.5; .5 SOLUTION RESPIRATORY (INHALATION) at 04:44

## 2018-09-27 RX ADMIN — SIMVASTATIN SCH MG: 10 TABLET, FILM COATED ORAL at 22:05

## 2018-09-27 RX ADMIN — ENOXAPARIN SODIUM SCH MG: 40 INJECTION SUBCUTANEOUS at 10:31

## 2018-09-27 RX ADMIN — EXTENDED PHENYTOIN SODIUM SCH MG: 100 CAPSULE ORAL at 05:15

## 2018-09-27 RX ADMIN — Medication SCH ML: at 22:06

## 2018-09-27 RX ADMIN — Medication SCH ML: at 05:15

## 2018-09-27 RX ADMIN — GABAPENTIN SCH MG: 300 CAPSULE ORAL at 22:05

## 2018-09-27 RX ADMIN — SODIUM CHLORIDE PRN MLS/HR: 9 INJECTION, SOLUTION INTRAVENOUS at 04:03

## 2018-09-27 RX ADMIN — LISINOPRIL SCH MG: 10 TABLET ORAL at 10:19

## 2018-09-27 NOTE — RADIOLOGY REPORT (SQ)
EXAM DESCRIPTION:  CHEST 2 VIEWS



COMPLETED DATE/TIME:  9/27/2018 8:33 am



REASON FOR STUDY:  Pneumonia



COMPARISON:  None.



EXAM PARAMETERS:  NUMBER OF VIEWS: two views

TECHNIQUE: Digital Frontal and Lateral radiographic views of the chest acquired.

RADIATION DOSE: NA

LIMITATIONS: none



FINDINGS:  LUNGS AND PLEURA:  Patchy fairly extensive airspace disease in the right perihilar region 
suggest pneumonic infiltrate.  Small right pleural effusion.  The left lung is stable in appearance. 
 No pneumothorax.

MEDIASTINUM AND HILAR STRUCTURES: No masses or contour abnormalities.

HEART AND VASCULAR STRUCTURES: Heart normal size.  No evidence for failure.

BONES: No acute findings.

HARDWARE: None in the chest.

OTHER: No other significant finding.



IMPRESSION:  1.  Since the previous examination dated 4/6/2017, new fairly extensive right perihilar 
airspace disease suggest pneumonia.  Small right pleural effusion.



TECHNICAL DOCUMENTATION:  JOB ID:  [id number]

 2011 [employer]- All Rights Reserved



Reading location - IP/workstation name: [email or web address]

## 2018-09-27 NOTE — PDOC PROGRESS REPORT
Subjective


Progress Note for:: 09/27/18


Subjective:: 


No chest pain or difficulty with breathing. No seizure activity. No abdominal 

pain, diarrhea, nausea, or vomiting. No fever or chills.


Reason For Visit: 


SEVERE BILATERAL PNEUMONIA,H/O SEIZURE,DILANTIN








Physical Exam


Vital Signs: 


 











Temp Pulse Resp BP Pulse Ox


 


 98.5 F   63   16   133/73 H  97 


 


 09/27/18 15:06  09/27/18 15:06  09/27/18 15:06  09/27/18 15:06  09/27/18 15:06








 Intake & Output











 09/26/18 09/27/18 09/28/18





 06:59 06:59 06:59


 


Intake Total 2064 2572 2585


 


Output Total 625 2225 1750


 


Balance 1439 347 835


 


Weight 91.1 kg 92.5 kg 











Physical Exam: 


General appearance: PRESENT: no acute distress 


Head exam: PRESENT: atraumatic, normocephalic


Eye exam: PRESENT: conjunctiva pink, EOMI, PERRLA.  ABSENT: scleral icterus


Ear exam: PRESENT: normal external ear exam


Mouth exam: PRESENT: moist, tongue midline


Respiratory exam: PRESENT: clear to auscultation, improved lower lung zone 

breath sounds


Cardiovascular exam: PRESENT: RRR.  ABSENT: diastolic murmur, rubs, systolic 

murmur


Vascular exam: ABSENT: pallor


GI/Abdominal exam: PRESENT: normal bowel sounds, soft.  ABSENT: distended, 

guarding, mass, organomegaly, rebound, tenderness


Extremities exam: ABSENT: pedal edema


Musculoskeletal exam: PRESENT: normal inspection


Neurological exam: PRESENT: alert, awake, oriented to person, oriented to place

, oriented to time, oriented to situation, CN II-XII grossly intact.  ABSENT: 

motor sensory deficit


Skin exam: PRESENT: dry, intact, warm.  ABSENT: cyanosis, rash





Results


Laboratory Results: 


 





 09/26/18 05:41 





 09/26/18 05:41 





 





09/25/18 18:15   Sputum   Gram Stain - Final


09/25/18 18:15   Sputum   Sputum Culture - Final


                            Yeast, Not Candida Albicans


                            Normal Leonie Absent





 











  09/23/18 09/23/18





  16:20 16:20


 


Creatine Kinase  93 


 


CK-MB (CK-2)   0.62


 


Troponin I   0.066


 


NT-Pro-B Natriuret Pep   1040 H











Impressions: 


 





Chest CT  09/23/18 00:00


IMPRESSION:  Patchy bilateral airspace disease worrisome pneumonia.


 








Chest X-Ray  09/27/18 08:00


IMPRESSION:  1.  Since the previous examination dated 4/6/2017, new fairly 

extensive right perihilar airspace disease suggest pneumonia.  Small right 

pleural effusion.


 














Assessment & Plan





- Diagnosis


(1) Aspiration pneumonia


Qualifiers: 


   Aspiration pneumonia type: unspecified   Laterality: bilateral   Lung 

location: unspecified part of lung   Qualified Code(s): J69.0 - Pneumonitis due 

to inhalation of food and vomit   


Is this a current diagnosis for this admission?: Yes   





(2) Altered mental status


Qualifiers: 


   Altered mental status type: unspecified   Qualified Code(s): R41.82 - 

Altered mental status, unspecified   


Is this a current diagnosis for this admission?: Yes   





(3) Acute hypoxemic respiratory failure


Is this a current diagnosis for this admission?: Yes   





(4) Dilantin toxicity


Qualifiers: 


   Encounter type: initial encounter   Injury intent: undetermined intent   

Qualified Code(s): T42.0X4A - Poisoning by hydantoin derivatives, undetermined, 

initial encounter   


Is this a current diagnosis for this admission?: Yes   





(5) Epilepsy


Qualifiers: 


   Epilepsy type: unspecified   Intractability: not intractable   Status 

epilepticus: without status epilepticus   Qualified Code(s): G40.909 - Epilepsy

, unspecified, not intractable, without status epilepticus   


Is this a current diagnosis for this admission?: Yes   





- Time


Time Spent with patient: 25-34 minutes


Medications reviewed and adjusted accordingly: Yes


Anticipated discharge: Home


Within: Other





- Inpatient Certification


Based on my medical assessment, after consideration of the patient's 

comorbidities, presenting symptoms, or acuity I expect that the services needed 

warrant INPATIENT care.: Yes


I certify that my determination is in accordance with my understanding of 

Medicare's requirements for reasonable and necessary INPATIENT services [42 CFR 

412.3e].: Yes


Medical Necessity: Need Close Monitoring Due to Risk of Patient Decompensation, 

Need For IV Fluids, Need For Continuous Telemetry Monitoring, Need for IV 

Antibiotics, Risk of Complication if Not Cared For in Hospital


Post Hospital Care: D/C Planner Documentation





- Plan Summary


Plan Summary: 





See attending physician orders.

## 2018-09-28 VITALS — DIASTOLIC BLOOD PRESSURE: 87 MMHG | SYSTOLIC BLOOD PRESSURE: 153 MMHG

## 2018-09-28 RX ADMIN — IPRATROPIUM BROMIDE AND ALBUTEROL SCH PUFF: 20; 100 SPRAY, METERED RESPIRATORY (INHALATION) at 12:42

## 2018-09-28 RX ADMIN — LEVOFLOXACIN SCH MLS/HR: 750 INJECTION, SOLUTION INTRAVENOUS at 09:51

## 2018-09-28 RX ADMIN — SODIUM CHLORIDE PRN MLS/HR: 9 INJECTION, SOLUTION INTRAVENOUS at 09:54

## 2018-09-28 RX ADMIN — PIPERACILLIN AND TAZOBACTAM SCH MLS/HR: 3; .375 INJECTION, POWDER, LYOPHILIZED, FOR SOLUTION INTRAVENOUS; PARENTERAL at 06:02

## 2018-09-28 RX ADMIN — ENOXAPARIN SODIUM SCH MG: 40 INJECTION SUBCUTANEOUS at 09:53

## 2018-09-28 RX ADMIN — LISINOPRIL SCH MG: 10 TABLET ORAL at 09:47

## 2018-09-28 RX ADMIN — Medication SCH: at 14:25

## 2018-09-28 RX ADMIN — EXTENDED PHENYTOIN SODIUM SCH MG: 100 CAPSULE ORAL at 06:06

## 2018-09-28 RX ADMIN — GABAPENTIN SCH MG: 300 CAPSULE ORAL at 06:06

## 2018-09-28 RX ADMIN — PIPERACILLIN AND TAZOBACTAM SCH MLS/HR: 3; .375 INJECTION, POWDER, LYOPHILIZED, FOR SOLUTION INTRAVENOUS; PARENTERAL at 12:29

## 2018-09-28 RX ADMIN — ASPIRIN SCH MG: 81 TABLET, COATED ORAL at 09:47

## 2018-09-28 RX ADMIN — PHENOBARBITAL SCH MG: 32.4 TABLET ORAL at 09:58

## 2018-09-28 RX ADMIN — GABAPENTIN SCH MG: 300 CAPSULE ORAL at 14:24

## 2018-09-28 RX ADMIN — PIPERACILLIN AND TAZOBACTAM SCH MLS/HR: 3; .375 INJECTION, POWDER, LYOPHILIZED, FOR SOLUTION INTRAVENOUS; PARENTERAL at 00:55

## 2018-09-28 RX ADMIN — EXTENDED PHENYTOIN SODIUM SCH MG: 100 CAPSULE ORAL at 14:24

## 2018-09-28 RX ADMIN — IPRATROPIUM BROMIDE AND ALBUTEROL SCH PUFF: 20; 100 SPRAY, METERED RESPIRATORY (INHALATION) at 06:02

## 2018-09-28 RX ADMIN — Medication SCH ML: at 06:07

## 2018-09-28 NOTE — PDOC DISCHARGE SUMMARY
General





- Admit/Disc Date/PCP


Admission Date/Primary Care Provider: 


  09/23/18 11:36





  RAPHAEL DEANN





Discharge Date: 09/28/18





- Discharge Diagnosis


(1) Aspiration pneumonia


Is this a current diagnosis for this admission?: Yes   





(2) Altered mental status


Is this a current diagnosis for this admission?: Yes   





(3) Acute hypoxemic respiratory failure


Is this a current diagnosis for this admission?: Yes   





(4) Dilantin toxicity


Is this a current diagnosis for this admission?: Yes   





(5) Epilepsy


Is this a current diagnosis for this admission?: Yes   





- Additional Information


Discharge Diet: Cardiac


Discharge Activity: Activity As Tolerated - No driving.


Prescriptions: 


Levofloxacin 500 mg PO DAILY #7 tablet


Phenytoin Sodium Extended [Dilantin 100 mg Capsule.er] 100 mg PO Q8 #90 capsule


Home Medications: 








Aspirin [Ecotrin 81 mg EC Tablet] 81 mg PO DAILY 09/23/18 


Gabapentin [Neurontin] 600 mg PO Q8 09/23/18 


Lisinopril [Prinivil] 20 mg PO DAILY 09/23/18 


Phenobarbital [Phenobarbital 32.4 mg Tablet] 32.4 mg PO Q12 09/23/18 


Simvastatin [Zocor 20 mg Tablet] 20 mg PO QHS 09/23/18 


Levofloxacin 500 mg PO DAILY #7 tablet 09/28/18 


Phenytoin Sodium Extended [Dilantin 100 mg Capsule.er] 100 mg PO Q8 #90 capsule 

09/28/18 











History of Present Illness


Patient complains of: Difficulty with breathing, Posible Seizure


History of Present Illness: 


ANGY GILBERT is a 67 year old male, he has a history of seizure, on 

Dilantin, patient was brought to the emergency room by his spouse with EMS for 

evaluation of difficulty breathing, possible seizure poor response to verbal 

command.  Patient spouse was worried, she thought the patient was having a 

seizure, he has a history of seizure disorder, in the emergency room he was 

evaluated, he was found to have elevated Dilantin level, the chest x-ray 

suggests pneumonia.  I requested for CT scan of his lung without contrast, it 

demonstrated, debris or mucus in the right main stem bronchus, also found was 

patchy diffuse airspace disease in the right upper lobe, right lower lobe, left 

patchy disease in the superior segment lower lobe worrisome for pneumonia, this 

suggests aspiration pneumonia based on the CT scan findings.  I saw him in the 

emergency room, he is awake and responsive to my questions, he denies any cough 

but he admitted to shortness of breath the oxygen saturation on 2 L nasal 

cannula is more than 90%.





Hospital Course


Hospital Course: 


He was management with IV Levofloxacin and Zosyn as well as supplemental 

oxygen. His Dilantin dosing was adjusted to 100 mg p.o q8 hours for total daily 

dosage of 300 mg down from 400 mg due to toxicity upon arrival in the ED. He 

had bedside Flutter and Incentive spirometry for pulmonary toileting effort due 

to aspiration. His subsequent chest X ray revealed right perihilar 

consolidation. His blood culture was no growth x 5 days. He had 5 days of IV 

antibiotic therapy. He remain afebrile with resolved leukocytosis and 

tolerating oral food intake. He will be discharged home today on continue oral 

antibiotic Levofloxacin 500 mg p.o daily x 5 days. He will follow up in the 

office as instructed upon discharge.





Physical Exam


Vital Signs: 


 











Temp Pulse Resp BP Pulse Ox


 


 98.0 F   65   16   153/87 H  95 


 


 09/28/18 10:54  09/28/18 16:00  09/28/18 16:00  09/28/18 10:54  09/28/18 16:00








 Intake & Output











 09/27/18 09/28/18 09/29/18





 06:59 06:59 06:59


 


Intake Total 2572 2885 1935


 


Output Total 2225 4325 975


 


Balance 347 -1440 960


 


Weight 92.5 kg 91.6 kg 











Physical Exam: 


General appearance: PRESENT: no acute distress 


Head exam: PRESENT: atraumatic, normocephalic


Eye exam: PRESENT: conjunctiva pink, EOMI, PERRLA.  ABSENT: scleral icterus


Ear exam: PRESENT: normal external ear exam


Mouth exam: PRESENT: moist, tongue midline


Respiratory exam: PRESENT: clear to auscultation, improved lower lung zone 

breath sounds


Cardiovascular exam: PRESENT: RRR.  ABSENT: diastolic murmur, rubs, systolic 

murmur


Vascular exam: ABSENT: pallor


GI/Abdominal exam: PRESENT: normal bowel sounds, soft.  ABSENT: distended, 

guarding, mass, organomegaly, rebound, tenderness


Extremities exam: ABSENT: pedal edema


Musculoskeletal exam: PRESENT: normal inspection


Neurological exam: PRESENT: alert, awake, oriented to person, oriented to place

, oriented to time, oriented to situation, CN II-XII grossly intact.  ABSENT: 

motor sensory deficit


Skin exam: PRESENT: dry, intact, warm.  ABSENT: cyanosis, rash





Results


Laboratory Results: 


 





 09/27/18 19:41 





 09/26/18 05:41 





 











  09/27/18





  19:41


 


WBC  4.6


 


RBC  3.83 L


 


Hgb  10.8 L


 


Hct  33.3 L


 


MCV  87


 


MCH  28.2


 


MCHC  32.4


 


RDW  13.9


 


Plt Count  254


 


Seg Neutrophils %  56.5


 


Lymphocytes %  27.0


 


Monocytes %  10.8


 


Eosinophils %  4.8


 


Basophils %  0.9


 


Absolute Neutrophils  2.6


 


Absolute Lymphocytes  1.2


 


Absolute Monocytes  0.5


 


Absolute Eosinophils  0.2


 


Absolute Basophils  0.0








 











  09/23/18 09/23/18





  16:20 16:20


 


Creatine Kinase  93 


 


CK-MB (CK-2)   0.62


 


Troponin I   0.066


 


NT-Pro-B Natriuret Pep   1040 H











Impressions: 


 





Chest CT  09/23/18 00:00


IMPRESSION:  Patchy bilateral airspace disease worrisome pneumonia.


 








Chest X-Ray  09/27/18 08:00


IMPRESSION:  1.  Since the previous examination dated 4/6/2017, new fairly 

extensive right perihilar airspace disease suggest pneumonia.  Small right 

pleural effusion.


 














Qualifiers





- *


PATIENT BEING DISCHARGED WITH ANY OF THE FOLLOWING DIAGNOSIS: No





Plan


Discharge Plan: 





Discharge home today. Follow up in the office as instructed upon discharge.

## 2019-01-11 ENCOUNTER — HOSPITAL ENCOUNTER (OUTPATIENT)
Dept: HOSPITAL 62 - RAD | Age: 69
End: 2019-01-11
Attending: INTERNAL MEDICINE
Payer: MEDICARE

## 2019-01-11 DIAGNOSIS — N45.1: ICD-10-CM

## 2019-01-11 DIAGNOSIS — N43.3: Primary | ICD-10-CM

## 2019-01-11 PROCEDURE — 76870 US EXAM SCROTUM: CPT

## 2019-01-11 NOTE — RADIOLOGY REPORT (SQ)
EXAM DESCRIPTION:  U/S SCROTUM W/O DOPPLER



COMPLETED DATE/TIME:  1/11/2019 5:00 pm



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
D DISORDERS OF THE MALE GENITAL ORGANS



COMPARISON:  None.



TECHNIQUE:  Static and realtime gray scale imaging of the scrotum and testes.  Selected color Doppler
 and spectral images recorded to document blood flow.



LIMITATIONS:  None.



FINDINGS:  RIGHT:

TESTICLE: Normal size, 3.6 x 1.9 x 2.1 cm. Normal echotexture.  Normal blood flow.  No mass.

EPIDIDYMIS: 10 mm.  Normal.

HYDROCELE OR VARICOCELE: No.

HERNIA OR EXTRA-TESTICULAR MASS: No.

OTHER: No other significant finding.

LEFT:

TESTICLE: Normal size, 3.7 x 2.3 x 2.5 cm.  . Normal echotexture.  Normal blood flow.  No mass.

EPIDIDYMIS: Prominent, measures 17 mm.

HYDROCELE OR VARICOCELE: Large septated hydrocele 9.3 x 6.7 x 5.9 cm.

HERNIA OR EXTRA-TESTICULAR MASS: No.

OTHER: No other significant finding.



IMPRESSION:  Large left hydrocele.  Prominent epididymis, correlate for epididymitis.



TECHNICAL DOCUMENTATION:  JOB ID:  [id number]

 2011 [employer]- All Rights Reserved



Reading location - IP/workstation name: [email or web address]

## 2019-02-19 ENCOUNTER — HOSPITAL ENCOUNTER (EMERGENCY)
Dept: HOSPITAL 62 - ER | Age: 69
LOS: 1 days | Discharge: HOME | End: 2019-02-20
Payer: MEDICARE

## 2019-02-19 DIAGNOSIS — I10: ICD-10-CM

## 2019-02-19 DIAGNOSIS — E78.00: ICD-10-CM

## 2019-02-19 DIAGNOSIS — G40.909: Primary | ICD-10-CM

## 2019-02-19 PROCEDURE — 81001 URINALYSIS AUTO W/SCOPE: CPT

## 2019-02-19 PROCEDURE — 93010 ELECTROCARDIOGRAM REPORT: CPT

## 2019-02-19 PROCEDURE — 85025 COMPLETE CBC W/AUTO DIFF WBC: CPT

## 2019-02-19 PROCEDURE — 93005 ELECTROCARDIOGRAM TRACING: CPT

## 2019-02-19 PROCEDURE — 83605 ASSAY OF LACTIC ACID: CPT

## 2019-02-19 PROCEDURE — 36415 COLL VENOUS BLD VENIPUNCTURE: CPT

## 2019-02-19 PROCEDURE — 82550 ASSAY OF CK (CPK): CPT

## 2019-02-19 PROCEDURE — 70450 CT HEAD/BRAIN W/O DYE: CPT

## 2019-02-19 PROCEDURE — 80307 DRUG TEST PRSMV CHEM ANLYZR: CPT

## 2019-02-19 PROCEDURE — 80185 ASSAY OF PHENYTOIN TOTAL: CPT

## 2019-02-19 PROCEDURE — 84484 ASSAY OF TROPONIN QUANT: CPT

## 2019-02-19 PROCEDURE — 99284 EMERGENCY DEPT VISIT MOD MDM: CPT

## 2019-02-19 PROCEDURE — 80053 COMPREHEN METABOLIC PANEL: CPT

## 2019-02-19 NOTE — ER DOCUMENT REPORT
Addendum entered and electronically signed by ASHA MATOS PA-C  02/20/19

12:18: 








Course





- Re-evaluation


Re-evalutation: 





02/20/19 12:17


Troponin resulted and has peaked, current level is 0.079.  Also, Dilantin level 

was 9.8, just below normal range.  Patient has not taken his Dilantin this 

morning in his usual doses 100 mg p.o. capsules 2 times per day.  I am giving 

him Dilantin 100 mg p.o. 1 time prior to discharge.  Patient does have a follow-

up appointment at the end of the week.  I suggested that they may need to 

reassess his medication regimen for his epilepsy.  Patient is alert and oriented

and understands the plan.  Patient is stable for discharge.





- Vital Signs


Vital signs: 





                                        











Temp Pulse Resp BP Pulse Ox


 


 98.1 F   56 L  15   158/79 H  100 


 


 02/20/19 06:00  02/20/19 02:00  02/20/19 12:01  02/20/19 12:01  02/20/19 12:01














- Laboratory


Result Diagrams: 


                                 02/20/19 03:00





                                 02/20/19 03:00


Laboratory results interpreted by me: 





                                        











  02/20/19 02/20/19 02/20/19





  00:54 03:00 03:00


 


RBC   4.34 L 


 


Hgb   12.1 L 


 


Hct   37.0 L 


 


Potassium    5.3 H


 


ALT    8 L


 


Alkaline Phosphatase    134 H


 


Urine Protein  30 H  


 


Urine Blood  SMALL H  


 


Urine Urobilinogen  2.0 H  


 


Phenytoin   














  02/20/19





  11:00


 


RBC 


 


Hgb 


 


Hct 


 


Potassium 


 


ALT 


 


Alkaline Phosphatase 


 


Urine Protein 


 


Urine Blood 


 


Urine Urobilinogen 


 


Phenytoin  9.8 L














Addendum entered and electronically signed by ASHA MATOS PA-C  02/20/19

09:37: 








Course





- Re-evaluation


Re-evalutation: 





02/20/19 09:36


Received patient from LAUREL Smith, at sign out at 7 AM.  When injuries mys

elf to the patient.  The repeat troponin did not peak and is slightly elevated 

at 0.104.  I did discuss with patient and his wife that we wanted the troponin 

to peak prior to discharge.  They are amenable to getting a repeat troponin at 

10 AM.  Also, wife was wondering why he had a seizure of this duration which is 

the longest she was ever seen.  She is unclear if his serum Dilantin levels were

adequate so I added a Dilantin.  Patient is seen by Dr. Hendricks and does have 

an appointment on Friday.  Waiting for troponin.





- Vital Signs


Vital signs: 





                                        











Temp Pulse Resp BP Pulse Ox


 


 98.1 F   56 L  15   130/75 H  100 


 


 02/20/19 06:00  02/20/19 02:00  02/20/19 07:01  02/20/19 07:01  02/20/19 07:01














- Laboratory


Result Diagrams: 


                                 02/20/19 03:00





                                 02/20/19 03:00


Laboratory results interpreted by me: 





                                        











  02/20/19 02/20/19 02/20/19





  00:54 03:00 03:00


 


RBC   4.34 L 


 


Hgb   12.1 L 


 


Hct   37.0 L 


 


Potassium    5.3 H


 


ALT    8 L


 


Alkaline Phosphatase    134 H


 


Urine Protein  30 H  


 


Urine Blood  SMALL H  


 


Urine Urobilinogen  2.0 H  














Original Note:








ED Seizure





- General


Stated Complaint: POSSIBLE SEIZURE


Time Seen by Provider: 02/19/19 23:29


Primary Care Provider: 


RAPHAEL ZACARIAS MD [Primary Care Provider] - Follow up as needed


Information source: Relative


Cannot obtain history due to: Altered mental status


Notes: 





HISTORY OF PRESENT ILLNESS:





Patient is a 68-year-old male with a past medical history of seizure disorder 

secondary to surgical resection of intracerebral tumor "years ago" currently 

maintained on phenytoin who presents with recurrent seizure activity.  Patient 

is unable to give information, therefore his wife at bedside is the primary 

source of history.





Location: Global, generalized


Onset: Sudden


Provocation: None


Quality: "Full body shaking"


Radiation: None


Severity: Moderate


Timing: "Lasted for about 3-4 minutes"


History of seizures: Yes, currently takes phenytoin


Recent head injury: None


Associated symptoms: Denies fevers or chills, no head injuries, no vision 

changes, no recent illnesses or sick contacts











REVIEW OF SYSTEMS:





CONSTITUTIONAL : Denies fever or chills, no sweats.  Denies recent illness.


EENT:   Denies eye, ear, throat, or mouth pain or symptoms.  Denies nasal or 

sinus congestion.


CARDIOVASCULAR: Denies chest pain.


RESPIRATORY: Denies cough, cold, or chest congestion.  Denies shortness of 

breath, difficulty breathing, or wheezing.


GASTROINTESTINAL: Denies abdominal pain.  Denies nausea, vomiting, or diarrhea. 

Denies constipation. 


GENITOURINARY:  Denies difficulty urinating, painful urination, burning, 

frequency, or blood in urine.


MUSCULOSKELETAL: Denies body aches. Denies neck or back pain or joint pain or 

swelling.


SKIN:   Denies rash or skin lesions.


HEMATOLOGIC :  Denies easy bruising or bleeding.


LYMPHATIC:  Denies swollen, enlarged glands.


NEUROLOGICAL: Positive for seizures.  Denies altered mental status or loss of 

consciousness.  Denies weakness or paralysis or loss of use of either side.  

Denies problems with gait or speech.  Denies sensory or motor loss.


PSYCHIATRIC:  Denies anxiety or stress or depression.





All other systems reviewed and negative.











PHYSICAL EXAMINATION:





GENERAL: Somnolent but arousable to sternal rub, no acute distress, protecting 

his airway.


HEAD: Atraumatic, normocephalic. No scalp deformity, depression, or crepitance.


EYES: Pupils are 1mm and equal/round/reactive to light, extraocular movements 

intact, sclera anicteric, conjunctiva are normal.


ENT: Nares patent bilaterally, oropharynx clear without exudates or palatal 

petechia.  Moist mucous membranes.  No tonsil hypertrophy.


NECK: Normal range of motion, supple without lymphadenopathy.


LUNGS: Breath sounds present, equal, and clear to auscultation bilaterally.  No 

wheezes, rales, or rhonchi.


HEART: Regular rate and rhythm without murmurs, rubs, or gallops. 2+ peripheral 

pulses.  Normal capillary refill.


ABDOMEN: Soft, nontender, nondistended. Normoactive bowel sounds.  No guarding, 

no rebound.  No masses appreciated.


BACK: Normal contour, no midline tenderness. Rectal exam deferred.


EXTREMITIES: Normal range of motion, no pitting or edema.  No cyanosis.


NEUROLOGICAL: No focal neurological deficits. Cranial nerves III-XII grossly 

intact. Moves all extremities spontaneously.


PSYCH: Normal mood, normal affect.  No suicidal thoughts/ideations.  No 

homicidal thoughts/ideations.  No hallucinations.


SKIN: Warm, dry, normal turgor, no rashes or lesions noted.











ASSESSMENT AND PLAN:





This patient is a 68-year-old male who presents with recurrent seizure activity.





1. Will obtain labs, urine, head CT, and reassess the patient.


2. Will continue to observe into the patient is alert and oriented to his 

baseline.


TRAVEL OUTSIDE OF THE [place] IN LAST 30 DAYS: No





- Related Data


Allergies/Adverse Reactions: 


                                        





No Known Allergies Allergy (Verified 10/24/17 10:17)


   











Past Medical History





- General


Information source: Relative


Cannot obtain history due to: Altered mental status





- Social History


Smoking Status: Former Smoker


Chew tobacco use (# tins/day): No


Frequency of alcohol use: None


Drug Abuse: None


Lives with: Family


Family History: Reviewed & Not Pertinent


Patient has suicidal ideation: No


Patient has homicidal ideation: No





- Past Medical History


Cardiac Medical History: Reports: Hx Hypercholesterolemia, Hx Hypertension


Pulmonary Medical History: Reports: None


EENT Medical History: Reports: None


Neurological Medical History: Reports: Hx Seizures


Endocrine Medical History: Reports: None


Renal/ Medical History: Reports: None.  Denies: Hx Peritoneal Dialysis


Malignancy Medical History: Reports None


GI Medical History: Reports: None


Musculoskeletal Medical History: Reports None


Skin Medical History: Reports None


Psychiatric Medical History: Reports: None


   Denies: Hx Depression


Traumatic Medical History: Reports: None


Infectious Medical History: Reports: None


Past Surgical History: Reports: Hx Neurologic Surgery - Brain tumor removal, 

Other - Brain tumor  Fibrous Dysplasia resection





- Immunizations


Immunizations up to date: Yes


Hx Diphtheria, Pertussis, Tetanus Vaccination: Yes


Hx Pneumococcal Vaccination: 10/16/14





Physical Exam





- Vital signs


Vitals: 


                                        











Resp


 


 18 


 


 02/19/19 22:09














Course





- Re-evaluation


Re-evalutation: 





02/20/19 04:27


Head CT is negative.  Labs, including urinalysis with toxicology screen, or 

normal.  Patient is improving but still slightly altered according to his wife. 

Of note, patient did have a mildly elevated troponin but below the acute MI 

cutoff.  Plan will be to continue to observe the patient and to have a repeat 4-

hour troponin.





- Vital Signs


Vital signs: 


                                        











Temp Pulse Resp BP Pulse Ox


 


 97.8 F      16   135/83 H  100 


 


 02/19/19 22:10     02/20/19 03:01  02/20/19 03:01  02/20/19 03:01














- Laboratory


Result Diagrams: 


                                 02/20/19 03:00





                                 02/20/19 03:00


Laboratory results interpreted by me: 


                                        











  02/20/19 02/20/19 02/20/19





  00:54 03:00 03:00


 


RBC   4.34 L 


 


Hgb   12.1 L 


 


Hct   37.0 L 


 


Potassium    5.3 H


 


ALT    8 L


 


Alkaline Phosphatase    134 H


 


Urine Protein  30 H  


 


Urine Blood  SMALL H  


 


Urine Urobilinogen  2.0 H  














- Diagnostic Test


Radiology reviewed: Image reviewed, Reports reviewed





- EKG Interpretation by Me


EKG shows normal: Sinus rhythm


Rate: Normal


Rhythm: NSR.  No: SVT, Arrthymia, A.Fib, A.Flutter, with a 2:1 Block, V.Tach, 

Torsades, V. Fib, MAT, PVC's, APC's, Other


Axis/QRS: No: Right axis deviation, Left axis deviation, RBBB, LBBB, IVCD, 

LAHB/LAFB, LPHB/LPFB, Bifasicular block


Voltage: No: Increased voltage, Consistant with LVH, Decreased voltage, 

Throughout, Limb leads


P Waves: No: SO, LAE, Absent, AV Dissociation, Other


Heart block present: No: 1st Degree, Mobitz 1, Mobitz 2, CHB (3rd degree block)


When compared to previous EKG there are: Previous EKG unavailable





- Transfer of Care


Care transferred to following provider: Quintin Ramesh


Notes: 





02/20/19 04:32


Signout given to LAUREL Smith. The plan has been discussed.  Plan is to 

repeat troponin at 7 AM and discharge the patient home if trending down and the 

patient is back to his baseline mental status.





Discharge





- Discharge


Clinical Impression: 


 Seizure disorder





Condition: Good


Disposition: HOME, SELF-CARE


Instructions:  Seizure, Known Epileptic ([facility])


Additional Instructions: 


You have been evaluated in the Emergency Department for having seizure activity.

 A CT scan of your head was normal, and you are now back to her baseline and 

safe to go home. Please follow-up with your primary physician as instructed in 1

week to be rechecked. Return to the Emergency Department if you experience 

confusion/disorientation, high fevers, difficulty walking, vision changes, or 

any other concerning symptoms.


Referrals: 


RAPHAEL ZACARIAS MD [Primary Care Provider] - Follow up as needed


Print Language: English

## 2019-02-20 VITALS — DIASTOLIC BLOOD PRESSURE: 79 MMHG | SYSTOLIC BLOOD PRESSURE: 158 MMHG

## 2019-02-20 LAB
ADD MANUAL DIFF: NO
ALBUMIN SERPL-MCNC: 4.2 G/DL (ref 3.5–5)
ALP SERPL-CCNC: 134 U/L (ref 38–126)
ALT SERPL-CCNC: 8 U/L (ref 21–72)
ANION GAP SERPL CALC-SCNC: 11 MMOL/L (ref 5–19)
APPEARANCE UR: (no result)
APTT PPP: YELLOW S
AST SERPL-CCNC: 21 U/L (ref 17–59)
BARBITURATES UR QL SCN: (no result)
BASOPHILS # BLD AUTO: 0.1 10^3/UL (ref 0–0.2)
BASOPHILS NFR BLD AUTO: 1.1 % (ref 0–2)
BILIRUB DIRECT SERPL-MCNC: 0.4 MG/DL (ref 0–0.4)
BILIRUB SERPL-MCNC: 0.5 MG/DL (ref 0.2–1.3)
BILIRUB UR QL STRIP: NEGATIVE
BUN SERPL-MCNC: 17 MG/DL (ref 7–20)
CALCIUM: 9.1 MG/DL (ref 8.4–10.2)
CHLORIDE SERPL-SCNC: 105 MMOL/L (ref 98–107)
CK SERPL-CCNC: 72 U/L (ref 55–170)
CO2 SERPL-SCNC: 26 MMOL/L (ref 22–30)
EOSINOPHIL # BLD AUTO: 0.1 10^3/UL (ref 0–0.6)
EOSINOPHIL NFR BLD AUTO: 1.9 % (ref 0–6)
ERYTHROCYTE [DISTWIDTH] IN BLOOD BY AUTOMATED COUNT: 13.9 % (ref 11.5–14)
ETHANOL SERPL-MCNC: < 10 MG/DL
GLUCOSE SERPL-MCNC: 102 MG/DL (ref 75–110)
GLUCOSE UR STRIP-MCNC: NEGATIVE MG/DL
HCT VFR BLD CALC: 37 % (ref 37.9–51)
HGB BLD-MCNC: 12.1 G/DL (ref 13.5–17)
KETONES UR STRIP-MCNC: NEGATIVE MG/DL
LYMPHOCYTES # BLD AUTO: 1.6 10^3/UL (ref 0.5–4.7)
LYMPHOCYTES NFR BLD AUTO: 31.4 % (ref 13–45)
MCH RBC QN AUTO: 27.8 PG (ref 27–33.4)
MCHC RBC AUTO-ENTMCNC: 32.6 G/DL (ref 32–36)
MCV RBC AUTO: 85 FL (ref 80–97)
METHADONE UR QL SCN: NEGATIVE
MONOCYTES # BLD AUTO: 0.6 10^3/UL (ref 0.1–1.4)
MONOCYTES NFR BLD AUTO: 11.2 % (ref 3–13)
NEUTROPHILS # BLD AUTO: 2.7 10^3/UL (ref 1.7–8.2)
NEUTS SEG NFR BLD AUTO: 54.4 % (ref 42–78)
NITRITE UR QL STRIP: NEGATIVE
PCP UR QL SCN: NEGATIVE
PH UR STRIP: 6 [PH] (ref 5–9)
PLATELET # BLD: 166 10^3/UL (ref 150–450)
POTASSIUM SERPL-SCNC: 5.3 MMOL/L (ref 3.6–5)
PROT SERPL-MCNC: 7.7 G/DL (ref 6.3–8.2)
PROT UR STRIP-MCNC: 30 MG/DL
RBC # BLD AUTO: 4.34 10^6/UL (ref 4.35–5.55)
SODIUM SERPL-SCNC: 141.5 MMOL/L (ref 137–145)
SP GR UR STRIP: 1.01
TOTAL CELLS COUNTED % (AUTO): 100 %
URINE AMPHETAMINES SCREEN: NEGATIVE
URINE BENZODIAZEPINES SCREEN: (no result)
URINE COCAINE SCREEN: NEGATIVE
URINE MARIJUANA (THC) SCREEN: NEGATIVE
UROBILINOGEN UR-MCNC: 2 MG/DL (ref ?–2)
WBC # BLD AUTO: 5 10^3/UL (ref 4–10.5)

## 2019-02-20 NOTE — EKG REPORT
SEVERITY:- OTHERWISE NORMAL ECG -

SINUS RHYTHM

LEFT AXIS DEVIATION

:

Confirmed by: Reza Harris MD 20-Feb-2019 07:55:27

## 2019-02-20 NOTE — RADIOLOGY REPORT (SQ)
EXAM DESCRIPTION: 



CT HEAD WITHOUT IV CONTRAST



COMPLETED DATE/TME:  02/20/2019 00:48



CLINICAL HISTORY: Seizure



COMPARISON: 10/24/2017



TECHNIQUE: Axial CT of the head obtained from the skull apex to

the skull base without contrast.



FINDINGS: 

No acute intracranial hemorrhage identified. No mass, mass

effect, shift of the midline, abnormal extra-axial fluid

collection or CT evidence of acute ischemic change identified.

The ventricular system and sulcal spaces have normal size and

morphology.  Scattered areas of hypodensity throughout the

supratentorial white matter are nonspecific and may be related to

chronic small vessel ischemic change.



The visualized paranasal sinuses and the mastoids are clear.  

Remote left frontal craniotomy with orbital roof reconstruction

and fat packing along the greater wing of the sphenoid is stable.

No acute calvarial fractures identified.  Visualized orbits and

globes are unremarkable. Atherosclerotic calcification of the

intracranial internal carotid arteries.



DLP: 2181.90 mGy-cm



IMPRESSION:

1.  No acute intracranial abnormality by CT criteria. Stable

postoperative change.



This exam was performed according to our departmental

dose-optimization program, which includes automated exposure

control, adjustment of the mA and/or kV according to patient size

and/or use of iterative reconstruction technique.

## 2020-01-23 ENCOUNTER — HOSPITAL ENCOUNTER (EMERGENCY)
Dept: HOSPITAL 62 - ER | Age: 70
Discharge: HOME | End: 2020-01-23
Payer: MEDICARE

## 2020-01-23 VITALS — SYSTOLIC BLOOD PRESSURE: 130 MMHG | DIASTOLIC BLOOD PRESSURE: 86 MMHG

## 2020-01-23 DIAGNOSIS — G40.909: Primary | ICD-10-CM

## 2020-01-23 DIAGNOSIS — T42.6X6A: ICD-10-CM

## 2020-01-23 DIAGNOSIS — Z91.138: ICD-10-CM

## 2020-01-23 DIAGNOSIS — I10: ICD-10-CM

## 2020-01-23 DIAGNOSIS — Z91.14: ICD-10-CM

## 2020-01-23 LAB
ADD MANUAL DIFF: NO
ALBUMIN SERPL-MCNC: 3.8 G/DL (ref 3.5–5)
ALP SERPL-CCNC: 102 U/L (ref 38–126)
ANION GAP SERPL CALC-SCNC: 12 MMOL/L (ref 5–19)
AST SERPL-CCNC: 33 U/L (ref 17–59)
BASOPHILS # BLD AUTO: 0.1 10^3/UL (ref 0–0.2)
BASOPHILS NFR BLD AUTO: 1 % (ref 0–2)
BILIRUB DIRECT SERPL-MCNC: 0.5 MG/DL (ref 0–0.4)
BILIRUB SERPL-MCNC: 0.6 MG/DL (ref 0.2–1.3)
BUN SERPL-MCNC: 13 MG/DL (ref 7–20)
CALCIUM: 9.2 MG/DL (ref 8.4–10.2)
CHLORIDE SERPL-SCNC: 102 MMOL/L (ref 98–107)
CO2 SERPL-SCNC: 24 MMOL/L (ref 22–30)
EOSINOPHIL # BLD AUTO: 0.1 10^3/UL (ref 0–0.6)
EOSINOPHIL NFR BLD AUTO: 2.1 % (ref 0–6)
ERYTHROCYTE [DISTWIDTH] IN BLOOD BY AUTOMATED COUNT: 14.3 % (ref 11.5–14)
ETHANOL SERPL-MCNC: < 10 MG/DL
GLUCOSE SERPL-MCNC: 99 MG/DL (ref 75–110)
HCT VFR BLD CALC: 35.9 % (ref 37.9–51)
HGB BLD-MCNC: 11.6 G/DL (ref 13.5–17)
LYMPHOCYTES # BLD AUTO: 1.1 10^3/UL (ref 0.5–4.7)
LYMPHOCYTES NFR BLD AUTO: 18 % (ref 13–45)
MCH RBC QN AUTO: 28 PG (ref 27–33.4)
MCHC RBC AUTO-ENTMCNC: 32.4 G/DL (ref 32–36)
MCV RBC AUTO: 86 FL (ref 80–97)
MONOCYTES # BLD AUTO: 0.3 10^3/UL (ref 0.1–1.4)
MONOCYTES NFR BLD AUTO: 5.3 % (ref 3–13)
NEUTROPHILS # BLD AUTO: 4.5 10^3/UL (ref 1.7–8.2)
NEUTS SEG NFR BLD AUTO: 73.6 % (ref 42–78)
PLATELET # BLD: 367 10^3/UL (ref 150–450)
POTASSIUM SERPL-SCNC: 5.1 MMOL/L (ref 3.6–5)
PROT SERPL-MCNC: 7.9 G/DL (ref 6.3–8.2)
RBC # BLD AUTO: 4.16 10^6/UL (ref 4.35–5.55)
TOTAL CELLS COUNTED % (AUTO): 100 %
WBC # BLD AUTO: 6.1 10^3/UL (ref 4–10.5)

## 2020-01-23 PROCEDURE — 96365 THER/PROPH/DIAG IV INF INIT: CPT

## 2020-01-23 PROCEDURE — 85025 COMPLETE CBC W/AUTO DIFF WBC: CPT

## 2020-01-23 PROCEDURE — 36415 COLL VENOUS BLD VENIPUNCTURE: CPT

## 2020-01-23 PROCEDURE — 99284 EMERGENCY DEPT VISIT MOD MDM: CPT

## 2020-01-23 PROCEDURE — 80307 DRUG TEST PRSMV CHEM ANLYZR: CPT

## 2020-01-23 PROCEDURE — 80053 COMPREHEN METABOLIC PANEL: CPT

## 2020-01-23 NOTE — ER DOCUMENT REPORT
ED General





- General


Chief Complaint: Probable Seizure


Stated Complaint: POSSIBLE SEIZURE


Time Seen by Provider: 01/23/20 03:43


Primary Care Provider: 


RAPHAEL ZACARIAS MD [Primary Care Provider] - Follow up as needed


TRAVEL OUTSIDE OF THE [place] IN LAST 30 DAYS: No





- HPI


Notes: 





69-year-old male presenting after having multiple seizures at home.  This man 

has a longstanding seizure disorder supposed to be taking Keppra.  He was 

scheduled for colonoscopy this week and apparently misunderstood the 

instructions he was given in was under the impression that he was not allowed to

take his Keppra.  He has had none of the medicine in the last 3 days.  3 

back-to-back seizures tonight.  Now postictal.








No fever, vomiting or diarrhea.





- Related Data


Allergies/Adverse Reactions: 


                                        





No Known Allergies Allergy (Verified 02/20/19 11:33)


   











Past Medical History





- General


Information source: Patient, Relative





- Social History


Smoking Status: Unknown if Ever Smoked


Family History: Reviewed & Not Pertinent


Patient has suicidal ideation: No


Patient has homicidal ideation: No





- Past Medical History


Cardiac Medical History: Reports: Hx Hypercholesterolemia, Hx Hypertension


Neurological Medical History: Reports: Hx Seizures


Renal/ Medical History: Denies: Hx Peritoneal Dialysis


Psychiatric Medical History: 


   Denies: Hx Depression


Past Surgical History: Reports: Hx Neurologic Surgery - Brain tumor removal, 

Other - Brain tumor  Fibrous Dysplasia resection





- Immunizations


Immunizations up to date: Yes


Hx Diphtheria, Pertussis, Tetanus Vaccination: Yes


Hx Pneumococcal Vaccination: 10/16/14





Review of Systems





- Review of Systems


-: Yes ROS unobtainable due to patient's medical condition





Physical Exam





- Vital signs


Vitals: 


                                        











Resp


 


 28 H


 


 01/23/20 03:27














- Notes


Notes: 











GENERAL: Elderly male who is post ictal oriented only to his name at this point.





SKIN: Good turgor no rashes.





HEAD: Normocephalic atraumatic.





EYES: PERRLA.  EOMI.  Conjunctivae and sclerae clear.





EARS: CANALS AND TMS CLEAR.





NOSE: CLEAR.





MOUTH: Moist mucosa.  Good dentition.  No stridor or edema.  No drooling.





NECK: Supple.  No masses or thyromegaly.  No adenopathy.  Carotids 2+ without 

bruits.  No JVD.





BACK: Symmetrical without tenderness.





CHEST: Respirations unlabored.  Breath sounds clear and symmetrical.





HEART: Regular rhythm.  No murmur gallop or rub.





ABDOMEN: Soft nontender without masses, organomegaly or rebound.  Bowel sounds 

normally active.  No bruits.





GENITALIA: Deferred.





EXTREMITIES: No edema.  No calf tenderness.  Cap refill less than 1.5 seconds.  

Dorsalis pedis and posterior tibial pulses 3+ and symmetrical.





NEUROLOGICAL: GCS 14.  Postictal.  Fluent speech.  Cranial nerves II through XII

intact.  Sensorimotor and cerebellar normal.  2+ ankle clonus.











Course





- Re-evaluation


Re-evalutation: 





01/23/20 05:00


Patient is received IV Ativan.  We will check some routine labs and I have given

him Keppra 1 g IV.


01/23/20 06:26


Reevaluation patient at this time shows patient to be alert and oriented x3 and 

he is able to ambulate in the room.  His labs here are unremarkable.  He appears

stable for discharge been instructed to restart his Keppra as previously 

prescribed and to avoid missing doses.





- Vital Signs


Vital signs: 


                                        











Temp Pulse Resp BP Pulse Ox


 


 98.9 F      15   125/82   96 


 


 01/23/20 03:30     01/23/20 05:01  01/23/20 05:01  01/23/20 05:01














- Laboratory


Result Diagrams: 


                                 01/23/20 03:37





                                 01/23/20 05:25


Laboratory results interpreted by me: 


                                        











  01/23/20 01/23/20





  03:37 05:25


 


RBC  4.16 L 


 


Hgb  11.6 L 


 


Hct  35.9 L 


 


RDW  14.3 H 


 


Potassium   5.1 H


 


Direct Bilirubin   0.5 H














Discharge





- Discharge


Clinical Impression: 


 Seizure, Noncompliance





Condition: Stable


Disposition: HOME, SELF-CARE


Additional Instructions: 


Restart your Keppra as previously prescribed and avoid missing doses.





Return here as needed for new or worsening symptoms:





Recurrence of seizures


Pain that is worsening or unimproved


Uncontrolled vomiting


High fever or shaking chills


Overall worsening


Referrals: 


RAPHAEL ZACARIAS MD [Primary Care Provider] - Follow up as needed

## 2020-09-11 ENCOUNTER — HOSPITAL ENCOUNTER (EMERGENCY)
Dept: HOSPITAL 62 - ER | Age: 70
Discharge: HOME | End: 2020-09-11
Payer: MEDICARE

## 2020-09-11 VITALS — DIASTOLIC BLOOD PRESSURE: 128 MMHG | SYSTOLIC BLOOD PRESSURE: 142 MMHG

## 2020-09-11 DIAGNOSIS — R05: ICD-10-CM

## 2020-09-11 DIAGNOSIS — Z79.899: ICD-10-CM

## 2020-09-11 DIAGNOSIS — R06.02: ICD-10-CM

## 2020-09-11 DIAGNOSIS — Z20.828: ICD-10-CM

## 2020-09-11 DIAGNOSIS — I10: ICD-10-CM

## 2020-09-11 DIAGNOSIS — J44.1: Primary | ICD-10-CM

## 2020-09-11 LAB
ADD MANUAL DIFF: NO
ALBUMIN SERPL-MCNC: 4.8 G/DL (ref 3.5–5)
ALP SERPL-CCNC: 175 U/L (ref 38–126)
ANION GAP SERPL CALC-SCNC: 8 MMOL/L (ref 5–19)
AST SERPL-CCNC: 27 U/L (ref 17–59)
BASOPHILS # BLD AUTO: 0 10^3/UL (ref 0–0.2)
BASOPHILS NFR BLD AUTO: 0.3 % (ref 0–2)
BILIRUB DIRECT SERPL-MCNC: 0.4 MG/DL (ref 0–0.4)
BILIRUB SERPL-MCNC: 0.4 MG/DL (ref 0.2–1.3)
BUN SERPL-MCNC: 13 MG/DL (ref 7–20)
CALCIUM: 9.7 MG/DL (ref 8.4–10.2)
CHLORIDE SERPL-SCNC: 103 MMOL/L (ref 98–107)
CK MB SERPL-MCNC: 1.73 NG/ML (ref ?–4.55)
CK SERPL-CCNC: 76 U/L (ref 55–170)
CO2 SERPL-SCNC: 32 MMOL/L (ref 22–30)
EOSINOPHIL # BLD AUTO: 0.5 10^3/UL (ref 0–0.6)
EOSINOPHIL NFR BLD AUTO: 5.4 % (ref 0–6)
ERYTHROCYTE [DISTWIDTH] IN BLOOD BY AUTOMATED COUNT: 14.5 % (ref 11.5–14)
GLUCOSE SERPL-MCNC: 108 MG/DL (ref 75–110)
HCT VFR BLD CALC: 41 % (ref 37.9–51)
HGB BLD-MCNC: 13.3 G/DL (ref 13.5–17)
LYMPHOCYTES # BLD AUTO: 1.1 10^3/UL (ref 0.5–4.7)
LYMPHOCYTES NFR BLD AUTO: 11.6 % (ref 13–45)
MCH RBC QN AUTO: 28.8 PG (ref 27–33.4)
MCHC RBC AUTO-ENTMCNC: 32.4 G/DL (ref 32–36)
MCV RBC AUTO: 89 FL (ref 80–97)
MONOCYTES # BLD AUTO: 0.5 10^3/UL (ref 0.1–1.4)
MONOCYTES NFR BLD AUTO: 6 % (ref 3–13)
NEUTROPHILS # BLD AUTO: 7 10^3/UL (ref 1.7–8.2)
NEUTS SEG NFR BLD AUTO: 76.7 % (ref 42–78)
PLATELET # BLD: 263 10^3/UL (ref 150–450)
POTASSIUM SERPL-SCNC: 5.2 MMOL/L (ref 3.6–5)
PROT SERPL-MCNC: 9.3 G/DL (ref 6.3–8.2)
RBC # BLD AUTO: 4.62 10^6/UL (ref 4.35–5.55)
TOTAL CELLS COUNTED % (AUTO): 100 %
TROPONIN I SERPL-MCNC: 0.11 NG/ML
WBC # BLD AUTO: 9.1 10^3/UL (ref 4–10.5)

## 2020-09-11 PROCEDURE — 82553 CREATINE MB FRACTION: CPT

## 2020-09-11 PROCEDURE — 94640 AIRWAY INHALATION TREATMENT: CPT

## 2020-09-11 PROCEDURE — 36415 COLL VENOUS BLD VENIPUNCTURE: CPT

## 2020-09-11 PROCEDURE — 99285 EMERGENCY DEPT VISIT HI MDM: CPT

## 2020-09-11 PROCEDURE — 84484 ASSAY OF TROPONIN QUANT: CPT

## 2020-09-11 PROCEDURE — C9803 HOPD COVID-19 SPEC COLLECT: HCPCS

## 2020-09-11 PROCEDURE — 96375 TX/PRO/DX INJ NEW DRUG ADDON: CPT

## 2020-09-11 PROCEDURE — 93010 ELECTROCARDIOGRAM REPORT: CPT

## 2020-09-11 PROCEDURE — 93005 ELECTROCARDIOGRAM TRACING: CPT

## 2020-09-11 PROCEDURE — 87635 SARS-COV-2 COVID-19 AMP PRB: CPT

## 2020-09-11 PROCEDURE — 71045 X-RAY EXAM CHEST 1 VIEW: CPT

## 2020-09-11 PROCEDURE — 82550 ASSAY OF CK (CPK): CPT

## 2020-09-11 PROCEDURE — 96365 THER/PROPH/DIAG IV INF INIT: CPT

## 2020-09-11 PROCEDURE — 96366 THER/PROPH/DIAG IV INF ADDON: CPT

## 2020-09-11 PROCEDURE — 80053 COMPREHEN METABOLIC PANEL: CPT

## 2020-09-11 PROCEDURE — 85025 COMPLETE CBC W/AUTO DIFF WBC: CPT

## 2020-09-11 RX ADMIN — MAGNESIUM SULFATE IN DEXTROSE SCH MLS/HR: 10 INJECTION, SOLUTION INTRAVENOUS at 13:18

## 2020-09-11 RX ADMIN — MAGNESIUM SULFATE IN DEXTROSE SCH MLS/HR: 10 INJECTION, SOLUTION INTRAVENOUS at 13:44

## 2020-09-11 RX ADMIN — MAGNESIUM SULFATE IN DEXTROSE SCH MLS/HR: 10 INJECTION, SOLUTION INTRAVENOUS at 15:01

## 2020-09-11 RX ADMIN — MAGNESIUM SULFATE IN DEXTROSE SCH MLS/HR: 10 INJECTION, SOLUTION INTRAVENOUS at 16:59

## 2020-09-11 NOTE — ER DOCUMENT REPORT
Entered by GEOVANY VILLEGAS SCRIBE  09/11/20 1310 





Acting as scribe for:ALEXIS NUGENT MD





ED Respiratory Problem





- General


Chief Complaint: Breathing Difficulty


Stated Complaint: SHORTNESS OF BREATH


Primary Care Provider: 


RAPHAEL ZACARIAS MD [Primary Care Provider] - Follow up as needed


Mode of Arrival: Wheelchair


Information source: Patient


Notes: 





This 69 year old male patient with a history of COPD and asthma presents to the 

ED today with complaints of shortness of breath for the past x2 days, worse this

afternoon. Patient reports associated nonproductive cough and wheezing. He 

states that he has been using his inhaler without relief. Denies chest pain.





TRAVEL OUTSIDE OF THE [place] IN LAST 30 DAYS: No





- Related Data


Allergies/Adverse Reactions: 


                                        





No Known Allergies Allergy (Verified 02/20/19 11:33)


   











Past Medical History





- General


Information source: Patient, [facility]





- Social History


Smoking Status: Unknown if Ever Smoked


Smoking Education Provided: No


Frequency of alcohol use: None


Drug Abuse: None


Family History: Reviewed & Not Pertinent


Patient has suicidal ideation: No


Patient has homicidal ideation: No





- Past Medical History


Cardiac Medical History: Reports: Hx Hypercholesterolemia, Hx Hypertension


Pulmonary Medical History: Reports: Hx Asthma, Hx COPD


Neurological Medical History: Reports: Hx Seizures


Past Surgical History: Reports: Hx Neurologic Surgery - Brain tumor removal, Hx 

Orthopedic Surgery - Fibrous Dysplasia resection





- Immunizations


Immunizations up to date: Yes


Hx Diphtheria, Pertussis, Tetanus Vaccination: Yes


Hx Pneumococcal Vaccination: 10/16/14





Review of Systems





- Review of Systems


Constitutional: No symptoms reported


EENT: No symptoms reported


Cardiovascular: See HPI.  denies: Chest pain


Respiratory: See HPI, Cough, Short of breath, Wheezing


Gastrointestinal: No symptoms reported


Genitourinary: No symptoms reported


Male Genitourinary: No symptoms reported


Musculoskeletal: No symptoms reported


Skin: No symptoms reported


Hematologic/Lymphatic: No symptoms reported


Neurological/Psychological: No symptoms reported


-: Yes All other systems reviewed and negative





Physical Exam





- Vital signs


Vitals: 


                                        











Temp Pulse Resp BP Pulse Ox


 


 97.7 F   85   32 H  141/82 H  97 


 


 09/11/20 12:44  09/11/20 12:44  09/11/20 12:44  09/11/20 12:44  09/11/20 12:44














- General


General appearance: Alert


In distress: None





- HEENT


Head: Normocephalic, Atraumatic


Eyes: Normal


Extraocular movements intact: Yes


Pupils: PERRL





- Respiratory


Respiratory status: No respiratory distress


Chest status: Nontender


Breath sounds: Wheezing


Chest palpation: Normal





- Cardiovascular


Rhythm: Regular


Heart sounds: Normal auscultation, S1 appreciated, S2 appreciated


Murmur: No


Friction rub: No


Gallop: None auscultated





- Abdominal


Inspection: Normal


Distension: No distension


Bowel sounds: Normal


Tenderness: Nontender - Abdomen soft


Organomegaly: No organomegaly





- Back


Back: Normal, Nontender





- Extremities


General upper extremity: Normal inspection


General lower extremity: Normal inspection.  No: Edema





- Neurological


Neuro grossly intact: Yes


Orientation: AAOx4


Platinum Coma Scale Eye Opening: Spontaneous


Platinum Coma Scale Verbal: Oriented


Platinum Coma Scale Motor: Obeys Commands


Rosemarie Coma Scale Total: 15





- Psychological


Associated symptoms: Normal affect, Normal mood





- Skin


Skin Temperature: Warm


Skin Moisture: Dry


Skin Color: Normal





Course





- Re-evaluation


Re-evalutation: 





09/11/20 17:42


Patient resting comfortably at this time not showing any signs of distress no 

audible wheezing at bedside.  Patient denies any chest pain or shortness of 

breath at this time.


09/11/20 18:05


Case discussed with Dr. Cota T on-call cardiologist reported the patient's 

history and the EKG with no acute changes with no chest pain whatsoever and a 

mild troponin leak in the indeterminate regions.  Dr. Cota recommended that he

does not believe that this is related to an acute coronary syndrome.  Plan is to

discharge patient home and have follow-up with Dr. Cota cardiologist on 

Monday.





- Vital Signs


Vital signs: 


                                        











Temp Pulse Resp BP Pulse Ox


 


 97.7 F   85   22 H  126/83 H  97 


 


 09/11/20 12:44  09/11/20 12:44  09/11/20 13:53  09/11/20 13:53  09/11/20 13:00











09/11/20 17:42


Vital signs stable 97% on room air at this time respirations are 12 currently.





- Laboratory


Result Diagrams: 


                                 09/11/20 13:21





                                 09/11/20 13:21


Laboratory results interpreted by me: 


                                        











  09/11/20 09/11/20





  13:21 13:21


 


Hgb  13.3 L 


 


RDW  14.5 H 


 


Lymph % (Auto)  11.6 L 


 


Potassium   5.2 H


 


Carbon Dioxide   32 H


 


Alkaline Phosphatase   175 H


 


Total Protein   9.3 H








Laboratories are mildly unremarkable potassium of 5.2 total protein 9.3 alk phos

175 and CO2 32.  Hemoglobin is 13.  Patient has troponin x2 that shows mild 

elevation; first troponin shows value 0.113; second troponin is 0.167.  Patient 

has no complaints of chest pain shortness of breath nausea vomiting or any chest

pressure.


09/11/20 17:44








- Diagnostic Test


Radiology reviewed: Image reviewed, Reports reviewed


Radiology results interpreted by me: 





09/11/20 17:45


Chest x-ray shows no acute process.





- EKG Interpretation by Me


Additional EKG results interpreted by me: 





09/11/20 17:46


Twelve-lead EKG shows a normal sinus rhythm rate of 58 sinus bradycardia.  Left 

axis deviation no acute ST-T wave changes.


09/11/20 18:07


Second twelve-lead EKG shows a normal sinus rhythm rate of 69 with a left axis 

deviation again no acute ST-T wave changes.





Discharge





- Discharge


Clinical Impression: 


 COPD exacerbation





Condition: Stable


Disposition: HOME, SELF-CARE


Additional Instructions: 


Chronic Obstructive Lung Disease





     You have chronic obstructive lung disease (COPD).  The symptoms come from 

emphysema (damage to small airways, with trapping of air in large sacks in the 

lung) and chronic bronchitis (repeated infection and damage to larger airways). 

The cause is almost always cigarette smoking, although dust exposure, asthma, 

and infections contribute.


     You should avoid fumes, dust, and smoke (especially tobacco smoke).  Your 

condition will flare from time to time.  There is no cure, but the symptoms can 

be treated.


     Bronchodilators (asthma medicine) are often helpful.  Antibiotics help when

infection is present.  When shortness of breath is severe, we may prescribe 

cortisone medication.  If medicine doesn't help enough, we can arrange for you 

to have an oxygen tank at home.


     Notify your doctor at once if sputum becomes thick, foul, or bloody, if you

develop a fever or chest pain, or if your shortness of breath worsens.


Prescriptions: 


Prednisone [Deltasone 20 mg Tablet] 20 mg PO BID #10 tablet


Referrals: 


RAPHAEL ZACARIAS MD [Primary Care Provider] - Follow up as needed


YUSUF COTA MD [ACTIVE STAFF] - 09/14/20 10:00 am





I personally performed the services described in the documentation, reviewed and

edited the documentation which was dictated to the scribe in my presence, and it

accurately records my words and actions.

## 2020-09-11 NOTE — EKG REPORT
SEVERITY:- ABNORMAL ECG -

SINUS RHYTHM

LEFT AXIS DEVIATION

CONSIDER POSTERIOR INFARCT

:

Confirmed by: Dewey Craft MD 11-Sep-2020 16:54:55

## 2020-09-14 NOTE — EKG REPORT
SEVERITY:- OTHERWISE NORMAL ECG -

SINUS RHYTHM

LEFT AXIS DEVIATION

:

Confirmed by: Dewey Craft MD 14-Sep-2020 02:50:22

## 2020-09-26 ENCOUNTER — HOSPITAL ENCOUNTER (INPATIENT)
Dept: HOSPITAL 62 - ER | Age: 70
LOS: 6 days | Discharge: HOME | DRG: 189 | End: 2020-10-02
Attending: INTERNAL MEDICINE | Admitting: INTERNAL MEDICINE
Payer: MEDICARE

## 2020-09-26 DIAGNOSIS — Z20.828: ICD-10-CM

## 2020-09-26 DIAGNOSIS — N17.9: ICD-10-CM

## 2020-09-26 DIAGNOSIS — Z79.899: ICD-10-CM

## 2020-09-26 DIAGNOSIS — E78.5: ICD-10-CM

## 2020-09-26 DIAGNOSIS — Z87.891: ICD-10-CM

## 2020-09-26 DIAGNOSIS — I10: ICD-10-CM

## 2020-09-26 DIAGNOSIS — E86.1: ICD-10-CM

## 2020-09-26 DIAGNOSIS — J96.02: Primary | ICD-10-CM

## 2020-09-26 DIAGNOSIS — J44.1: ICD-10-CM

## 2020-09-26 LAB
ADD MANUAL DIFF: NO
ALBUMIN SERPL-MCNC: 5 G/DL (ref 3.5–5)
ALP SERPL-CCNC: 188 U/L (ref 38–126)
AMYLASE SERPL-CCNC: 104 U/L (ref 30–110)
ANION GAP SERPL CALC-SCNC: 13 MMOL/L (ref 5–19)
APPEARANCE UR: CLEAR
APTT BLD: 48.3 SEC (ref 23.5–35.8)
APTT PPP: YELLOW S
ARTERIAL BLOOD FIO2: (no result)
ARTERIAL BLOOD H2CO3: 1.84 MMOL/L (ref 1.05–1.35)
ARTERIAL BLOOD HCO3: 30.2 MMOL/L (ref 20–24)
ARTERIAL BLOOD PCO2: 61.2 MMHG (ref 35–45)
ARTERIAL BLOOD PH: 7.31 (ref 7.35–7.45)
ARTERIAL BLOOD PO2: 174.8 MMHG (ref 80–100)
ARTERIAL BLOOD TOTAL CO2: 32.1 MMOL/L (ref 23–27)
AST SERPL-CCNC: 26 U/L (ref 17–59)
BARBITURATES UR QL SCN: (no result)
BASE EXCESS BLDA CALC-SCNC: 2.4 MMOL/L
BASOPHILS # BLD AUTO: 0 10^3/UL (ref 0–0.2)
BASOPHILS NFR BLD AUTO: 0.6 % (ref 0–2)
BILIRUB DIRECT SERPL-MCNC: 0.5 MG/DL (ref 0–0.4)
BILIRUB SERPL-MCNC: 0.6 MG/DL (ref 0.2–1.3)
BILIRUB UR QL STRIP: NEGATIVE
BUN SERPL-MCNC: 12 MG/DL (ref 7–20)
CALCIUM: 10.1 MG/DL (ref 8.4–10.2)
CHLORIDE SERPL-SCNC: 102 MMOL/L (ref 98–107)
CK MB SERPL-MCNC: 1.58 NG/ML (ref ?–4.55)
CO2 SERPL-SCNC: 29 MMOL/L (ref 22–30)
EOSINOPHIL # BLD AUTO: 0.8 10^3/UL (ref 0–0.6)
EOSINOPHIL NFR BLD AUTO: 13.2 % (ref 0–6)
ERYTHROCYTE [DISTWIDTH] IN BLOOD BY AUTOMATED COUNT: 14.5 % (ref 11.5–14)
FREE T4 (FREE THYROXINE): 1.39 NG/DL (ref 0.78–2.19)
GLUCOSE SERPL-MCNC: 125 MG/DL (ref 75–110)
GLUCOSE UR STRIP-MCNC: NEGATIVE MG/DL
HCT VFR BLD CALC: 41.4 % (ref 37.9–51)
HGB BLD-MCNC: 13.6 G/DL (ref 13.5–17)
INR PPP: 1.21
KETONES UR STRIP-MCNC: NEGATIVE MG/DL
LYMPHOCYTES # BLD AUTO: 1.4 10^3/UL (ref 0.5–4.7)
LYMPHOCYTES NFR BLD AUTO: 24.8 % (ref 13–45)
MCH RBC QN AUTO: 29.6 PG (ref 27–33.4)
MCHC RBC AUTO-ENTMCNC: 32.9 G/DL (ref 32–36)
MCV RBC AUTO: 90 FL (ref 80–97)
METHADONE UR QL SCN: NEGATIVE
MONOCYTES # BLD AUTO: 0.5 10^3/UL (ref 0.1–1.4)
MONOCYTES NFR BLD AUTO: 9.2 % (ref 3–13)
NEUTROPHILS # BLD AUTO: 3 10^3/UL (ref 1.7–8.2)
NEUTS SEG NFR BLD AUTO: 52.2 % (ref 42–78)
NITRITE UR QL STRIP: NEGATIVE
NT PRO BNP: 166 PG/ML (ref ?–125)
PCP UR QL SCN: NEGATIVE
PH UR STRIP: 5 [PH] (ref 5–9)
PHOSPHATE SERPL-MCNC: 4 MG/DL (ref 2.5–4.5)
PLATELET # BLD: 240 10^3/UL (ref 150–450)
POTASSIUM SERPL-SCNC: 4.8 MMOL/L (ref 3.6–5)
PROT SERPL-MCNC: 9.6 G/DL (ref 6.3–8.2)
PROT UR STRIP-MCNC: 100 MG/DL
PROTHROMBIN TIME: 15.5 SEC (ref 11.4–15.4)
RBC # BLD AUTO: 4.6 10^6/UL (ref 4.35–5.55)
SAO2 % BLDA: 99 % (ref 94–98)
SP GR UR STRIP: 1.01
TOTAL CELLS COUNTED % (AUTO): 100 %
TROPONIN I SERPL-MCNC: < 0.012 NG/ML
TROPONIN I SERPL-MCNC: < 0.012 NG/ML
TSH SERPL-ACNC: 0.48 UIU/ML (ref 0.47–4.68)
URINE AMPHETAMINES SCREEN: NEGATIVE
URINE BENZODIAZEPINES SCREEN: NEGATIVE
URINE COCAINE SCREEN: NEGATIVE
URINE MARIJUANA (THC) SCREEN: NEGATIVE
UROBILINOGEN UR-MCNC: NEGATIVE MG/DL (ref ?–2)
WBC # BLD AUTO: 5.7 10^3/UL (ref 4–10.5)

## 2020-09-26 PROCEDURE — 85610 PROTHROMBIN TIME: CPT

## 2020-09-26 PROCEDURE — 84439 ASSAY OF FREE THYROXINE: CPT

## 2020-09-26 PROCEDURE — 80307 DRUG TEST PRSMV CHEM ANLYZR: CPT

## 2020-09-26 PROCEDURE — 96365 THER/PROPH/DIAG IV INF INIT: CPT

## 2020-09-26 PROCEDURE — 82550 ASSAY OF CK (CPK): CPT

## 2020-09-26 PROCEDURE — 71260 CT THORAX DX C+: CPT

## 2020-09-26 PROCEDURE — 82803 BLOOD GASES ANY COMBINATION: CPT

## 2020-09-26 PROCEDURE — 85730 THROMBOPLASTIN TIME PARTIAL: CPT

## 2020-09-26 PROCEDURE — 81001 URINALYSIS AUTO W/SCOPE: CPT

## 2020-09-26 PROCEDURE — 84484 ASSAY OF TROPONIN QUANT: CPT

## 2020-09-26 PROCEDURE — 87040 BLOOD CULTURE FOR BACTERIA: CPT

## 2020-09-26 PROCEDURE — 96375 TX/PRO/DX INJ NEW DRUG ADDON: CPT

## 2020-09-26 PROCEDURE — 71045 X-RAY EXAM CHEST 1 VIEW: CPT

## 2020-09-26 PROCEDURE — 83690 ASSAY OF LIPASE: CPT

## 2020-09-26 PROCEDURE — 84100 ASSAY OF PHOSPHORUS: CPT

## 2020-09-26 PROCEDURE — 93005 ELECTROCARDIOGRAM TRACING: CPT

## 2020-09-26 PROCEDURE — 99285 EMERGENCY DEPT VISIT HI MDM: CPT

## 2020-09-26 PROCEDURE — 94640 AIRWAY INHALATION TREATMENT: CPT

## 2020-09-26 PROCEDURE — 84443 ASSAY THYROID STIM HORMONE: CPT

## 2020-09-26 PROCEDURE — 80053 COMPREHEN METABOLIC PANEL: CPT

## 2020-09-26 PROCEDURE — 83735 ASSAY OF MAGNESIUM: CPT

## 2020-09-26 PROCEDURE — 96366 THER/PROPH/DIAG IV INF ADDON: CPT

## 2020-09-26 PROCEDURE — 36415 COLL VENOUS BLD VENIPUNCTURE: CPT

## 2020-09-26 PROCEDURE — 83880 ASSAY OF NATRIURETIC PEPTIDE: CPT

## 2020-09-26 PROCEDURE — 83036 HEMOGLOBIN GLYCOSYLATED A1C: CPT

## 2020-09-26 PROCEDURE — 93010 ELECTROCARDIOGRAM REPORT: CPT

## 2020-09-26 PROCEDURE — 85025 COMPLETE CBC W/AUTO DIFF WBC: CPT

## 2020-09-26 PROCEDURE — 87086 URINE CULTURE/COLONY COUNT: CPT

## 2020-09-26 PROCEDURE — C9803 HOPD COVID-19 SPEC COLLECT: HCPCS

## 2020-09-26 PROCEDURE — 80048 BASIC METABOLIC PNL TOTAL CA: CPT

## 2020-09-26 PROCEDURE — 87635 SARS-COV-2 COVID-19 AMP PRB: CPT

## 2020-09-26 PROCEDURE — 80061 LIPID PANEL: CPT

## 2020-09-26 PROCEDURE — 80185 ASSAY OF PHENYTOIN TOTAL: CPT

## 2020-09-26 PROCEDURE — 82150 ASSAY OF AMYLASE: CPT

## 2020-09-26 PROCEDURE — 82140 ASSAY OF AMMONIA: CPT

## 2020-09-26 PROCEDURE — 82553 CREATINE MB FRACTION: CPT

## 2020-09-26 RX ADMIN — LEVOFLOXACIN SCH MLS/HR: 750 INJECTION, SOLUTION INTRAVENOUS at 19:58

## 2020-09-26 RX ADMIN — EXTENDED PHENYTOIN SODIUM SCH MG: 100 CAPSULE ORAL at 21:12

## 2020-09-26 RX ADMIN — METHYLPREDNISOLONE SODIUM SUCCINATE SCH MG: 125 INJECTION, POWDER, FOR SOLUTION INTRAMUSCULAR; INTRAVENOUS at 18:19

## 2020-09-26 RX ADMIN — MAGNESIUM SULFATE IN DEXTROSE SCH MLS/HR: 10 INJECTION, SOLUTION INTRAVENOUS at 08:07

## 2020-09-26 RX ADMIN — MAGNESIUM SULFATE IN DEXTROSE SCH MLS/HR: 10 INJECTION, SOLUTION INTRAVENOUS at 08:53

## 2020-09-26 RX ADMIN — ASPIRIN SCH MG: 81 TABLET, COATED ORAL at 18:18

## 2020-09-26 RX ADMIN — PHENOBARBITAL SCH MG: 32.4 TABLET ORAL at 21:12

## 2020-09-26 RX ADMIN — SIMVASTATIN SCH MG: 10 TABLET, FILM COATED ORAL at 21:12

## 2020-09-26 RX ADMIN — LISINOPRIL SCH MG: 10 TABLET ORAL at 18:18

## 2020-09-26 RX ADMIN — GABAPENTIN SCH MG: 300 CAPSULE ORAL at 21:12

## 2020-09-26 RX ADMIN — ENOXAPARIN SODIUM SCH MG: 40 INJECTION SUBCUTANEOUS at 18:20

## 2020-09-26 NOTE — RADIOLOGY REPORT (SQ)
EXAM DESCRIPTION:  CHEST SINGLE VIEW



IMAGES COMPLETED DATE/TIME:  9/26/2020 8:07 am



REASON FOR STUDY:  sob



COMPARISON:  9/11/2020.



EXAM PARAMETERS:  NUMBER OF VIEWS: One view.

TECHNIQUE: Single frontal radiographic view of the chest acquired.

RADIATION DOSE: NA

LIMITATIONS: None.



FINDINGS:  LUNGS AND PLEURA: No opacities, masses or pneumothorax. No pleural effusion.

MEDIASTINUM AND HILAR STRUCTURES: No masses.  Contour normal.

HEART AND VASCULAR STRUCTURES: Heart normal in size.  Normal vasculature.

BONES: No acute findings.

HARDWARE: None in the chest.

OTHER: No other significant finding.



IMPRESSION:  NO ACUTE RADIOGRAPHIC FINDING IN THE CHEST.



TECHNICAL DOCUMENTATION:  JOB ID:  [id number]

 2011 [employer]- All Rights Reserved



Reading location - IP/workstation name: [email or web address]

## 2020-09-26 NOTE — PDOC H&P
History of Present Illness


Admission Date/PCP: 


  09/26/20 14:42





  RAPHAEL ZACARIAS





History of Present Illness: 


ANGY GILBERT is a 69 year old male, He has a history of COPD he came to 

emergency room for evaluation of cough and shortness of breath.  The cough is 

productive of sputum scant amount, no hemoptysis, no chest pain no fever or 

chills no vomiting, he denied any exposure to SARS-CoV-2.  In the emergency room

he was evaluated he was found to have labored breathing with the use of 

accessory muscles of breathing there was expiratory wheeze on auscultation of 

the lung field.The arterial blood gas on FiO2 of 9 L PO2 174.8, pH 7.31, PCO2 

61.2, bicarbonate 30.2 consistent with acute hypercapnic acidosis, will need to 

reduce the FiO2 of this patient he has COPD he needs  hypoxic drive to maintain 

his breathing








Past Medical History


Cardiac Medical History: Reports: Hyperlipidema, Hypertension


Pulmonary Medical History: Reports: Asthma, Chronic Obstructive Pulmonary 

Disease (COPD)


Neurological Medical History: Reports: Seizures





Past Surgical History


Past Surgical History: Reports: Orthopedic Surgery - Fibrous Dysplasia 

resection, Other - Brain tumor  Fibrous Dysplasia resection





Social History


Smoking Status: Former Smoker


Frequency of Alcohol Use: None


Hx Recreational Drug Use: No


Drugs: None


Hx Prescription Drug Abuse: No





Family History


Family History: Reviewed & Not Pertinent


Parental Family History Reviewed: Yes


Children Family History Reviewed: Yes


Sibling(s) Family History Reviewed.: Yes





Medication/Allergy


Home Medications: 








Aspirin [Ecotrin 81 mg EC Tablet] 81 mg PO DAILY 09/23/18 


Gabapentin [Neurontin] 600 mg PO Q8 09/23/18 


Lisinopril [Prinivil] 20 mg PO DAILY 09/23/18 


Phenobarbital [Phenobarbital 32.4 mg Tablet] 32.4 mg PO Q12 09/23/18 


Simvastatin [Zocor 20 mg Tablet] 20 mg PO QHS 09/23/18 


Acetylcyst/[id number]/Levomefol [Metafolbic Plus Caplet] 1 cap PO DAILY 09/26/20 


Albuterol Sulfate [Proair Hfa Inhalation Aerosol 8.5 gm Mdi] 1 puff IH Q4 PRN 

09/26/20 


Phenytoin Sodium Extended [Dilantin 100 mg Capsule.er] 100 mg PO Q12 09/26/20 


Ubidecarenone/Vit E Acet [Co Q-10 100 mg Softgel] 1 each PO DAILY 09/26/20 


Umeclidinium Brm/Vilanterol Tr [Anoro Ellipta 62.5-25 Mcg INH] 1 each IH BID 

09/26/20 


Vit B1 Mn/B2/B3/B5/B6/B12/C/FA [B Complex with Vitamin C Tab] 1 each PO DAILY 

09/26/20 








Allergies/Adverse Reactions: 


                                        





No Known Allergies Allergy (Verified 09/26/20 08:22)


   











Review of Systems


Constitutional: ABSENT: chills, fever(s), headache(s), weight gain, weight loss


Eyes: ABSENT: visual disturbances


Ears: ABSENT: hearing changes


Cardiovascular: ABSENT: chest pain, dyspnea on exertion, edema, orthropnea, 

palpitations


Respiratory: PRESENT: cough, dyspnea, sputum.  ABSENT: hemoptysis


Gastrointestinal: ABSENT: abdominal pain, constipation, diarrhea, hematemesis, 

hematochezia, nausea, vomiting


Genitourinary: ABSENT: dysuria, hematuria


Musculoskeletal: ABSENT: joint swelling


Integumentary: ABSENT: rash, wounds


Neurological: ABSENT: abnormal gait, abnormal speech, confusion, dizziness, 

focal weakness, syncope


Psychiatric: ABSENT: anxiety, depression, homidical ideation, suicidal ideation


Endocrine: ABSENT: cold intolerance, heat intolerance, menstrual abnormalities, 

polydipsia, polyuria


Hematologic/Lymphatic: ABSENT: easy bleeding, easy bruising, lymphadenopathy





Physical Exam


Vital Signs: 


                                        











Temp Pulse Resp BP Pulse Ox


 


 97.9 F   57 L  22 H  146/71 H  99 


 


 09/26/20 17:22  09/26/20 17:22  09/26/20 17:22  09/26/20 17:22  09/26/20 17:22








                                 Intake & Output











 09/25/20 09/26/20 09/27/20





 06:59 06:59 06:59


 


Intake Total   437


 


Balance   437


 


Weight   87.9 kg











General appearance: PRESENT: severe distress


Head exam: PRESENT: atraumatic, normocephalic


Eye exam: PRESENT: PERRLA


Mouth exam: PRESENT: moist, tongue midline


Neck exam: PRESENT: full ROM


Respiratory exam: PRESENT: accessory muscle use, wheezes, other - Barrel-shaped 

chest wall


Cardiovascular exam: PRESENT: RRR, +S1, +S2


GI/Abdominal exam: PRESENT: normal bowel sounds, soft


Rectal exam: PRESENT: deferred


Neurological exam: PRESENT: alert, CN II-XII grossly intact


Psychiatric exam: PRESENT: appropriate affect, normal mood


Skin exam: PRESENT: dry, intact, warm





Results


Laboratory Results: 


                                        





                                 09/26/20 07:49 





                                 09/26/20 07:49 





                                        











  09/26/20 09/26/20 09/26/20





  07:49 07:49 07:49


 


WBC  5.7  


 


RBC  4.60  


 


Hgb  13.6  


 


Hct  41.4  


 


MCV  90  


 


MCH  29.6  


 


MCHC  32.9  


 


RDW  14.5 H  


 


Plt Count  240  


 


Seg Neutrophils %  52.2  


 


Carbonic Acid   


 


HCO3/H2CO3 Ratio   


 


ABG pH   


 


ABG pCO2   


 


ABG pO2   


 


ABG HCO3   


 


ABG O2 Saturation   


 


ABG Base Excess   


 


FiO2   


 


Sodium   143.5 


 


Potassium   4.8 


 


Chloride   102 


 


Carbon Dioxide   29 


 


Anion Gap   13 


 


BUN   12 


 


Creatinine   1.00 


 


Est GFR ( Amer)   > 60 


 


Glucose   125 H 


 


Calcium   10.1 


 


Phosphorus    4.0


 


Magnesium    2.3


 


Total Bilirubin   0.6 


 


AST   26 


 


Alkaline Phosphatase   188 H 


 


Ammonia   


 


Total Protein   9.6 H 


 


Albumin   5.0 


 


Amylase    104


 


Lipase    117.6


 


TSH   


 


Free T4   


 


Urine Color   


 


Urine Appearance   


 


Urine pH   


 


Ur Specific Gravity   


 


Urine Protein   


 


Urine Glucose (UA)   


 


Urine Ketones   


 


Urine Blood   


 


Urine Nitrite   


 


Ur Leukocyte Esterase   


 


Urine WBC (Auto)   


 


Urine RBC (Auto)   














  09/26/20 09/26/20 09/26/20





  07:49 08:17 09:33


 


WBC   


 


RBC   


 


Hgb   


 


Hct   


 


MCV   


 


MCH   


 


MCHC   


 


RDW   


 


Plt Count   


 


Seg Neutrophils %   


 


Carbonic Acid   1.84 H 


 


HCO3/H2CO3 Ratio   16:1 


 


ABG pH   7.31 L 


 


ABG pCO2   61.2 H 


 


ABG pO2   174.8 H 


 


ABG HCO3   30.2 H 


 


ABG O2 Saturation   99.0 H 


 


ABG Base Excess   2.4 


 


FiO2   9L 


 


Sodium   


 


Potassium   


 


Chloride   


 


Carbon Dioxide   


 


Anion Gap   


 


BUN   


 


Creatinine   


 


Est GFR (African Amer)   


 


Glucose   


 


Calcium   


 


Phosphorus   


 


Magnesium   


 


Total Bilirubin   


 


AST   


 


Alkaline Phosphatase   


 


Ammonia   


 


Total Protein   


 


Albumin   


 


Amylase   


 


Lipase   


 


TSH  0.48  


 


Free T4  1.39  


 


Urine Color    YELLOW


 


Urine Appearance    CLEAR


 


Urine pH    5.0


 


Ur Specific Gravity    1.013


 


Urine Protein    100 H


 


Urine Glucose (UA)    NEGATIVE


 


Urine Ketones    NEGATIVE


 


Urine Blood    MODERATE H


 


Urine Nitrite    NEGATIVE


 


Ur Leukocyte Esterase    NEGATIVE


 


Urine WBC (Auto)    1


 


Urine RBC (Auto)    9














  09/26/20





  18:25


 


WBC 


 


RBC 


 


Hgb 


 


Hct 


 


MCV 


 


MCH 


 


MCHC 


 


RDW 


 


Plt Count 


 


Seg Neutrophils % 


 


Carbonic Acid 


 


HCO3/H2CO3 Ratio 


 


ABG pH 


 


ABG pCO2 


 


ABG pO2 


 


ABG HCO3 


 


ABG O2 Saturation 


 


ABG Base Excess 


 


FiO2 


 


Sodium 


 


Potassium 


 


Chloride 


 


Carbon Dioxide 


 


Anion Gap 


 


BUN 


 


Creatinine 


 


Est GFR (African Amer) 


 


Glucose 


 


Calcium 


 


Phosphorus 


 


Magnesium 


 


Total Bilirubin 


 


AST 


 


Alkaline Phosphatase 


 


Ammonia  < 8.7 L


 


Total Protein 


 


Albumin 


 


Amylase 


 


Lipase 


 


TSH 


 


Free T4 


 


Urine Color 


 


Urine Appearance 


 


Urine pH 


 


Ur Specific Gravity 


 


Urine Protein 


 


Urine Glucose (UA) 


 


Urine Ketones 


 


Urine Blood 


 


Urine Nitrite 


 


Ur Leukocyte Esterase 


 


Urine WBC (Auto) 


 


Urine RBC (Auto) 








                                        











  09/26/20 09/26/20





  07:49 18:25


 


Troponin I  < 0.012 


 


NT-Pro-B Natriuret Pep  166 H  223 H











Impressions: 


                                        





Chest X-Ray  09/26/20 07:36


IMPRESSION:  NO ACUTE RADIOGRAPHIC FINDING IN THE CHEST.


 














Assessment & Plan





- Diagnosis


(1) Acute hypercapnic respiratory failure


Is this a current diagnosis for this admission?: Yes   


Plan: 


Patient in respiratory distress with the use of accessory muscle breathing 

presently requiring supplemental oxygen via nasal cannula, no need for 

noninvasive positive pressure ventilation, NIPPV.  The FiO2 need to be reduced 

to no more than 3 L hypoxic drive is needed in this patient with severe COPD.








(2) COPD exacerbation


Is this a current diagnosis for this admission?: Yes   


Plan: 


Start Solu-Medrol intravenously, bronchodilators, IV antibiotic








- Time


Time Spent: Greater than 70 Minutes


Medications reviewed and adjusted accordingly: Yes


Anticipated Discharge Disposition: Home, Self Care


Anticipated Discharge Timeframe: Within 7 days

## 2020-09-26 NOTE — ER DOCUMENT REPORT
ED General





- General


Stated Complaint: DIFFICULTY BREATHING


Time Seen by Provider: 09/26/20 07:38


Primary Care Provider: 


YUSUF COTA MD [ACTIVE STAFF] - Follow up as needed


TRAVEL OUTSIDE OF THE [place] IN LAST 30 DAYS: No





- HPI


Notes: 





Chief complaint: Shortness of breath





History of present illness: Mr. Hernandez is a 69-year-old male former smoker with

a longstanding history of COPD now presenting with a 3-day history of increasing

cough and dyspnea.  Producing scant amounts of white sputum.  No hemoptysis.  No

chest pain.  No fever or chills.  No vomiting.  Patient denies any known 

exposure to COVID.  He says he is not been previously tested for COVID.  He 

denies travel outside the area.  He was treated here fairly recently for an 

acute bronchitis and received burst therapy with prednisone at that time and was

treated with metered-dose inhalers but is no longer using these.  He does not 

use oxygen at home and does not have a home nebulizer at this time.





Primary care provider is Dr. Joshi.





- Related Data


Allergies/Adverse Reactions: 


                                        





No Known Allergies Allergy (Verified 09/26/20 08:22)


   











Past Medical History





- General


Information source: Patient, [facility] Records





- Social History


Smoking Status: Former Smoker


Frequency of alcohol use: None


Drug Abuse: None


Family History: Reviewed & Not Pertinent





- Past Medical History


Cardiac Medical History: Reports: Hx Hypercholesterolemia, Hx Hypertension


Pulmonary Medical History: Reports: Hx Asthma, Hx COPD


Neurological Medical History: Reports: Hx Seizures


Endocrine Medical History: Denies: Hx Diabetes Mellitus Type 1, Hx Diabetes 

Mellitus Type 2


Renal/ Medical History: Denies: Hx Peritoneal Dialysis


Psychiatric Medical History: Reports: Hx Schizophrenia


   Denies: Hx Depression


Past Surgical History: Reports: Hx Neurologic Surgery - Brain tumor removal, Hx 

Orthopedic Surgery - Fibrous Dysplasia resection, Other - Brain tumor  Fibrous 

Dysplasia resection





- Immunizations


Immunizations up to date: Yes


Hx Diphtheria, Pertussis, Tetanus Vaccination: Yes


Hx Pneumococcal Vaccination: 10/16/14





Review of Systems





- Review of Systems


Notes: 





Constitutional: Negative for fever.


HENT: Negative for sore throat.


Eyes: Negative for visual changes.


Cardiovascular: Negative for chest pain.


Respiratory: As per HPI.


Gastrointestinal: Negative for abdominal pain, vomiting or diarrhea.


Genitourinary: Negative for dysuria.


Musculoskeletal: Negative for back pain.


Skin: Negative for rash.


Neurological: Negative for headaches, weakness or numbness.





10 point ROS negative except as marked above and in HPI.








Physical Exam





- Vital signs


Vitals: 


                                        











Pulse Ox


 


 100 


 


 09/26/20 07:38














- Notes


Notes: 











GENERAL: Slender male approximately stated age very in moderate respiratory 

distress.





SKIN: Mildly diaphoretic.  Good turgor no rashes.





HEAD: Normocephalic atraumatic.





EYES: PERRLA.  EOMI.  Conjunctivae and sclerae clear.





EARS: CANALS AND TMS CLEAR.





NOSE: CLEAR.





MOUTH: Moist mucosa.  Good dentition.  No stridor or edema.  No drooling.





NECK: Supple.  No masses or thyromegaly.  No adenopathy.  Carotids 2+ without 

bruits.  No JVD.





BACK: Symmetrical without tenderness.





CHEST: Respirations are moderately labored.  Patient is tachypneic.  Mild use of

accessory muscles.  Diffuse rhonchi and end expiratory wheezes bilaterally.  

Deep rattling cough.  Patient was noted to have an O2 saturation on room air of 

88% at triage.  He is currently on a nonrebreather and now has a 100% O2 sat by 

pulse ox.





HEART: Regular rhythm.  No murmur gallop or rub.





ABDOMEN: Soft nontender without masses, organomegaly or rebound.  Bowel sounds 

normally active.  No bruits.





GENITALIA: Deferred.





EXTREMITIES: No edema.  No calf tenderness.  Cap refill less than 1.5 seconds.  

Dorsalis pedis and posterior tibial pulses 3+ and symmetrical.





NEUROLOGICAL: GCS 15.  Alert and oriented x3.  Normal gait.  Fluent speech.  

Cranial nerves II through XII intact.  Sensorimotor and cerebellar normal.  N

ormal tone.





PSYCHIATRIC: Slightly anxious affect.





Course





- Re-evaluation


Re-evalutation: 





09/26/20 13:20


Patient initially required 100% oxygen when he arrived.  He was weaned off this 

down to 2 L.  He was given IV magnesium, IV Solu-Medrol and multiple DuoNeb 

treatments.  His chest x-ray shows no infiltrates.  He did not have a fever and 

did not have elevation of white count.  COVID-19 nasal swab is pending.





Patient has some persistent wheezes on examination although his respiratory 

distress has resolved.  I have discussed findings with Dr. Hendricks on-call for

Dr. Joshi  and he will admit patient to the medical floor.





- Vital Signs


Vital signs: 


                                        











Temp Pulse Resp BP Pulse Ox


 


 97.9 F      18   124/79   100 


 


 09/26/20 09:36     09/26/20 13:01  09/26/20 13:01  09/26/20 13:01














- Laboratory


Result Diagrams: 


                                 09/26/20 07:49





                                 09/26/20 07:49


Laboratory results interpreted by me: 


                                        











  09/26/20 09/26/20 09/26/20





  07:49 07:49 07:49


 


RDW  14.5 H  


 


Eos % (Auto)  13.2 H  


 


Absolute Eos (auto)  0.8 H  


 


Carbonic Acid   


 


ABG pH   


 


ABG pCO2   


 


ABG pO2   


 


ABG HCO3   


 


ABG Total CO2   


 


ABG O2 Saturation   


 


Glucose   125 H 


 


Direct Bilirubin   0.5 H 


 


Alkaline Phosphatase   188 H 


 


NT-Pro-B Natriuret Pep    166 H


 


Total Protein   9.6 H 


 


Urine Protein   


 


Urine Blood   


 


Urine Ascorbic Acid   














  09/26/20 09/26/20





  08:17 09:33


 


RDW  


 


Eos % (Auto)  


 


Absolute Eos (auto)  


 


Carbonic Acid  1.84 H 


 


ABG pH  7.31 L 


 


ABG pCO2  61.2 H 


 


ABG pO2  174.8 H 


 


ABG HCO3  30.2 H 


 


ABG Total CO2  32.1 H 


 


ABG O2 Saturation  99.0 H 


 


Glucose  


 


Direct Bilirubin  


 


Alkaline Phosphatase  


 


NT-Pro-B Natriuret Pep  


 


Total Protein  


 


Urine Protein   100 H


 


Urine Blood   MODERATE H


 


Urine Ascorbic Acid   40 H














- Diagnostic Test


Radiology reviewed: Reports reviewed - Per radiologist: Portable chest x-ray 

normal.





- EKG Interpretation by Me


Additional EKG results interpreted by me: 





09/26/20 08:05


Twelve-lead EKG reviewed by me contemporaneously: 0757 hrs.


Indication for study: Dyspnea


Rhythm: Normal sinus


Rate: 72


Intervals: Normal


QRS axis: -37 degrees


ST/T wave changes: None


Comparison with prior tracing: Unchanged since prior tracing 9/11/2020





Interpretation: Normal sinus rhythm with left axis deviation





Discharge





- Discharge


Clinical Impression: 


 COPD exacerbation





Condition: Good


Disposition: ADMITTED AS INPATIENT


[facility]


Unit Admitted: Medical Floor


Referrals: 


YUSUF COTA MD [ACTIVE STAFF] - Follow up as needed

## 2020-09-26 NOTE — EKG REPORT
SEVERITY:- OTHERWISE NORMAL ECG -

SINUS RHYTHM

LEFT AXIS DEVIATION

:

Confirmed by: Jessica Proctor MD 26-Sep-2020 21:49:09

## 2020-09-26 NOTE — RADIOLOGY REPORT (SQ)
EXAM DESCRIPTION:  CT CHEST WITH



IMAGES COMPLETED DATE/TIME:  9/26/2020 7:25 pm



REASON FOR STUDY:  copd



COMPARISON:  9/23/2018



TECHNIQUE:  CT scan of the chest performed using helical scanning technique with dynamic intravenous 
contrast injection.  Images reviewed with lung, soft tissue and bone windows.  Reconstructed coronal 
and sagittal MPR and MIP images reviewed.  All images stored on PACS.

All CT scanners at this facility use dose modulation, iterative reconstruction, and/or weight based d
osing when appropriate to reduce radiation dose to as low as reasonably achievable (ALARA).

[facility]: Dose Right  [facility]: CareDose    [facility]: Dose Right    [facility]: Teradose 4D    [facility]: Smart Technologies



CONTRAST TYPE AND DOSE:  contrast/concentration: Isovue 350.00 mmol/ml; Total Contrast Delivered: 80.
0 ml; Total Saline Delivered: 55.0 ml



RENAL FUNCTION:  GFR > 60.



RADIATION DOSE:  CT Rad equipment meets quality standard of care and radiation dose reduction techniq
ues were employed. CTDIvol: 11.1 mGy. DLP: 441 mGy-cm. .



LIMITATIONS:  None.



FINDINGS:  LUNGS AND PLEURA: Small right pleural effusion and basilar subsegmental atelectasis -scarr
ing, chronic.  No pneumothorax. No consolidation.  No pulmonary masses.

HILAR AND MEDIASTINAL STRUCTURES: Similar paratracheal-subcarinal nodes.

HEART AND VASCULAR STRUCTURES: No aneurysm or dissection.  No central pulmonary emboli.  No pericardi
al effusion.  Heavy atherosclerotic coronary calcifications.

HARDWARE: None in the chest.

UPPER ABDOMEN: No significant findings.  Limited exam.

THYROID AND OTHER SOFT TISSUES: No masses.  No adenopathy.

BONES: No acute finding.

OTHER: No other significant finding.



IMPRESSION:  No acute findings. Small right pleural effusion and basilar subsegmental atelectasis -sc
arring, chronic.



TECHNICAL DOCUMENTATION:  JOB ID:  [id number]

[id number]

Quality ID # [id number]: Final reports with documentation of one or more dose reduction techniques (e.g., Au
tomated exposure control, adjustment of the mA and/or kV according to patient size, use of iterative 
reconstruction technique)

 2011 [employer]- All Rights Reserved



Reading location - IP/workstation name: for; to (do)

## 2020-09-27 LAB
ADD MANUAL DIFF: NO
ALBUMIN SERPL-MCNC: 4.3 G/DL (ref 3.5–5)
ALP SERPL-CCNC: 168 U/L (ref 38–126)
ANION GAP SERPL CALC-SCNC: 11 MMOL/L (ref 5–19)
ANION GAP SERPL CALC-SCNC: 12 MMOL/L (ref 5–19)
AST SERPL-CCNC: 22 U/L (ref 17–59)
BASOPHILS # BLD AUTO: 0 10^3/UL (ref 0–0.2)
BASOPHILS NFR BLD AUTO: 0.2 % (ref 0–2)
BILIRUB DIRECT SERPL-MCNC: 0.5 MG/DL (ref 0–0.4)
BILIRUB SERPL-MCNC: 0.5 MG/DL (ref 0.2–1.3)
BUN SERPL-MCNC: 21 MG/DL (ref 7–20)
BUN SERPL-MCNC: 33 MG/DL (ref 7–20)
CALCIUM: 9.5 MG/DL (ref 8.4–10.2)
CALCIUM: 9.8 MG/DL (ref 8.4–10.2)
CHLORIDE SERPL-SCNC: 102 MMOL/L (ref 98–107)
CHLORIDE SERPL-SCNC: 99 MMOL/L (ref 98–107)
CHOLEST SERPL-MCNC: 215.75 MG/DL (ref 0–200)
CK MB SERPL-MCNC: 1.39 NG/ML (ref ?–4.55)
CK MB SERPL-MCNC: 1.53 NG/ML (ref ?–4.55)
CK SERPL-CCNC: 82 U/L (ref 55–170)
CO2 SERPL-SCNC: 23 MMOL/L (ref 22–30)
CO2 SERPL-SCNC: 26 MMOL/L (ref 22–30)
EOSINOPHIL # BLD AUTO: 0 10^3/UL (ref 0–0.6)
EOSINOPHIL NFR BLD AUTO: 0.6 % (ref 0–6)
ERYTHROCYTE [DISTWIDTH] IN BLOOD BY AUTOMATED COUNT: 14 % (ref 11.5–14)
GLUCOSE SERPL-MCNC: 140 MG/DL (ref 75–110)
GLUCOSE SERPL-MCNC: 99 MG/DL (ref 75–110)
HCT VFR BLD CALC: 38.8 % (ref 37.9–51)
HGB BLD-MCNC: 12.7 G/DL (ref 13.5–17)
LDLC SERPL DIRECT ASSAY-MCNC: 87 MG/DL (ref ?–100)
LYMPHOCYTES # BLD AUTO: 0.7 10^3/UL (ref 0.5–4.7)
LYMPHOCYTES NFR BLD AUTO: 15.7 % (ref 13–45)
MCH RBC QN AUTO: 29.1 PG (ref 27–33.4)
MCHC RBC AUTO-ENTMCNC: 32.8 G/DL (ref 32–36)
MCV RBC AUTO: 89 FL (ref 80–97)
MONOCYTES # BLD AUTO: 0.2 10^3/UL (ref 0.1–1.4)
MONOCYTES NFR BLD AUTO: 4.7 % (ref 3–13)
NEUTROPHILS # BLD AUTO: 3.5 10^3/UL (ref 1.7–8.2)
NEUTS SEG NFR BLD AUTO: 78.8 % (ref 42–78)
PLATELET # BLD: 232 10^3/UL (ref 150–450)
POTASSIUM SERPL-SCNC: 5.6 MMOL/L (ref 3.6–5)
POTASSIUM SERPL-SCNC: 6 MMOL/L (ref 3.6–5)
PROT SERPL-MCNC: 8.5 G/DL (ref 6.3–8.2)
RBC # BLD AUTO: 4.38 10^6/UL (ref 4.35–5.55)
TOTAL CELLS COUNTED % (AUTO): 100 %
TRIGL SERPL-MCNC: 72 MG/DL (ref ?–150)
TROPONIN I SERPL-MCNC: < 0.012 NG/ML
TROPONIN I SERPL-MCNC: < 0.012 NG/ML
VLDLC SERPL CALC-MCNC: 14 MG/DL (ref 10–31)
WBC # BLD AUTO: 4.5 10^3/UL (ref 4–10.5)

## 2020-09-27 RX ADMIN — METHYLPREDNISOLONE SODIUM SUCCINATE SCH MG: 125 INJECTION, POWDER, FOR SOLUTION INTRAMUSCULAR; INTRAVENOUS at 02:53

## 2020-09-27 RX ADMIN — LEVOFLOXACIN SCH MLS/HR: 750 INJECTION, SOLUTION INTRAVENOUS at 17:45

## 2020-09-27 RX ADMIN — GABAPENTIN SCH MG: 300 CAPSULE ORAL at 06:23

## 2020-09-27 RX ADMIN — PHENOBARBITAL SCH MG: 32.4 TABLET ORAL at 21:14

## 2020-09-27 RX ADMIN — METHYLPREDNISOLONE SODIUM SUCCINATE SCH MG: 125 INJECTION, POWDER, FOR SOLUTION INTRAMUSCULAR; INTRAVENOUS at 17:45

## 2020-09-27 RX ADMIN — METHYLPREDNISOLONE SODIUM SUCCINATE SCH MG: 125 INJECTION, POWDER, FOR SOLUTION INTRAMUSCULAR; INTRAVENOUS at 10:55

## 2020-09-27 RX ADMIN — ENOXAPARIN SODIUM SCH MG: 40 INJECTION SUBCUTANEOUS at 10:55

## 2020-09-27 RX ADMIN — GABAPENTIN SCH MG: 300 CAPSULE ORAL at 21:14

## 2020-09-27 RX ADMIN — GABAPENTIN SCH MG: 300 CAPSULE ORAL at 13:54

## 2020-09-27 RX ADMIN — PHENOBARBITAL SCH MG: 32.4 TABLET ORAL at 10:54

## 2020-09-27 RX ADMIN — SIMVASTATIN SCH MG: 10 TABLET, FILM COATED ORAL at 21:14

## 2020-09-27 RX ADMIN — EXTENDED PHENYTOIN SODIUM SCH MG: 100 CAPSULE ORAL at 10:55

## 2020-09-27 RX ADMIN — SODIUM CHLORIDE PRN MLS/HR: 9 INJECTION, SOLUTION INTRAVENOUS at 20:46

## 2020-09-27 RX ADMIN — LISINOPRIL SCH MG: 10 TABLET ORAL at 10:55

## 2020-09-27 RX ADMIN — SODIUM POLYSTYRENE SULFONATE SCH GM: 15 SUSPENSION ORAL; RECTAL at 20:47

## 2020-09-27 RX ADMIN — ASPIRIN SCH MG: 81 TABLET, COATED ORAL at 10:55

## 2020-09-27 RX ADMIN — EXTENDED PHENYTOIN SODIUM SCH MG: 100 CAPSULE ORAL at 21:14

## 2020-09-27 NOTE — PDOC PROGRESS REPORT
Subjective


Progress Note for:: 09/27/20


Subjective:: 





Patient seen by the bedside, somewhat better today compared to yesterday, still 

wheezing on auscultation of the chest


Reason For Visit: 


COPD EXACERVATION,PUI FOR COVID








Physical Exam


Vital Signs: 


                                        











Temp Pulse Resp BP Pulse Ox


 


 97.7 F   57 L  20   125/70   100 


 


 09/27/20 08:12  09/27/20 08:12  09/27/20 08:12  09/27/20 08:12  09/27/20 08:12








                                 Intake & Output











 09/26/20 09/27/20 09/28/20





 06:59 06:59 06:59


 


Intake Total  587 


 


Balance  587 


 


Weight  87.9 kg 











General appearance: PRESENT: no acute distress


Eye exam: PRESENT: PERRLA


Respiratory exam: PRESENT: wheezes


Cardiovascular exam: PRESENT: +S1, +S2


GI/Abdominal exam: PRESENT: soft


Neurological exam: PRESENT: alert, CN II-XII grossly intact





Results


Laboratory Results: 


                                        





                                 09/27/20 06:51 





                                 09/27/20 06:51 





                                        











  09/26/20 09/26/20 09/26/20





  07:49 07:49 18:25


 


WBC   


 


RBC   


 


Hgb   


 


Hct   


 


MCV   


 


MCH   


 


MCHC   


 


RDW   


 


Plt Count   


 


Seg Neutrophils %   


 


Sodium   


 


Potassium   


 


Chloride   


 


Carbon Dioxide   


 


Anion Gap   


 


BUN   


 


Creatinine   


 


Est GFR (African Amer)   


 


Glucose   


 


Calcium   


 


Phosphorus  4.0  


 


Magnesium  2.3  


 


Total Bilirubin   


 


AST   


 


Alkaline Phosphatase   


 


Ammonia    < 8.7 L


 


Total Protein   


 


Albumin   


 


Triglycerides   


 


Cholesterol   


 


LDL Cholesterol Direct   


 


VLDL Cholesterol   


 


HDL Cholesterol   


 


Amylase  104  


 


Lipase  117.6  


 


TSH   0.48 


 


Free T4   1.39 














  09/27/20 09/27/20 09/27/20





  06:51 06:51 06:51


 


WBC   4.5 


 


RBC   4.38 


 


Hgb   12.7 L 


 


Hct   38.8 


 


MCV   89 


 


MCH   29.1 


 


MCHC   32.8 


 


RDW   14.0 


 


Plt Count   232 


 


Seg Neutrophils %   78.8 H 


 


Sodium  136.6 L  


 


Potassium  5.6 H  


 


Chloride  102  


 


Carbon Dioxide  23  


 


Anion Gap  12  


 


BUN  21 H  


 


Creatinine  1.22  


 


Est GFR ( Amer)  > 60  


 


Glucose  99  


 


Calcium  9.5  


 


Phosphorus   


 


Magnesium   


 


Total Bilirubin  0.5  


 


AST  22  


 


Alkaline Phosphatase  168 H  


 


Ammonia   


 


Total Protein  8.5 H  


 


Albumin  4.3  


 


Triglycerides    72


 


Cholesterol    215.75 H


 


LDL Cholesterol Direct    87


 


VLDL Cholesterol    14.0


 


HDL Cholesterol    115


 


Amylase   


 


Lipase   


 


TSH   


 


Free T4   








                                        











  09/26/20 09/26/20 09/26/20





  07:49 18:25 18:25


 


Creatine Kinase    71


 


CK-MB (CK-2)   


 


Troponin I  < 0.012  


 


NT-Pro-B Natriuret Pep  166 H  223 H 














  09/26/20 09/27/20 09/27/20





  18:25 00:45 00:45


 


Creatine Kinase   81 


 


CK-MB (CK-2)  1.58   1.53


 


Troponin I  < 0.012   < 0.012


 


NT-Pro-B Natriuret Pep   














  09/27/20 09/27/20





  06:51 06:51


 


Creatine Kinase  82 


 


CK-MB (CK-2)   1.39


 


Troponin I   < 0.012


 


NT-Pro-B Natriuret Pep  











Impressions: 


                                        





Chest CT  09/26/20 00:00


IMPRESSION:  No acute findings. Small right pleural effusion and basilar 

subsegmental atelectasis -scarring, chronic.


 








Chest X-Ray  09/26/20 07:36


IMPRESSION:  NO ACUTE RADIOGRAPHIC FINDING IN THE CHEST.


 














Assessment & Plan





- Diagnosis


(1) Acute hypercapnic respiratory failure


Is this a current diagnosis for this admission?: Yes   


Plan: 


Continue supplemental oxygen via nasal cannula at 2 L/min








(2) COPD exacerbation


Is this a current diagnosis for this admission?: Yes   


Plan: 


Continue Solu-Medrol, bronchodilators, antibiotic Levaquin








- Time


Time Spent with patient: 35 or more minutes


Level of Care: IMCU


Anticipated discharge: Home


Anticipated DC Timeframe: within 72 hours

## 2020-09-28 LAB
ADD MANUAL DIFF: NO
ANION GAP SERPL CALC-SCNC: 8 MMOL/L (ref 5–19)
BASOPHILS # BLD AUTO: 0 10^3/UL (ref 0–0.2)
BASOPHILS NFR BLD AUTO: 0.1 % (ref 0–2)
BUN SERPL-MCNC: 27 MG/DL (ref 7–20)
CALCIUM: 9.5 MG/DL (ref 8.4–10.2)
CHLORIDE SERPL-SCNC: 99 MMOL/L (ref 98–107)
CO2 SERPL-SCNC: 31 MMOL/L (ref 22–30)
EOSINOPHIL # BLD AUTO: 0 10^3/UL (ref 0–0.6)
EOSINOPHIL NFR BLD AUTO: 0.2 % (ref 0–6)
ERYTHROCYTE [DISTWIDTH] IN BLOOD BY AUTOMATED COUNT: 14.1 % (ref 11.5–14)
GLUCOSE SERPL-MCNC: 107 MG/DL (ref 75–110)
HCT VFR BLD CALC: 39.6 % (ref 37.9–51)
HGB BLD-MCNC: 13.2 G/DL (ref 13.5–17)
LYMPHOCYTES # BLD AUTO: 1.8 10^3/UL (ref 0.5–4.7)
LYMPHOCYTES NFR BLD AUTO: 29.7 % (ref 13–45)
MCH RBC QN AUTO: 29.4 PG (ref 27–33.4)
MCHC RBC AUTO-ENTMCNC: 33.3 G/DL (ref 32–36)
MCV RBC AUTO: 88 FL (ref 80–97)
MONOCYTES # BLD AUTO: 0.6 10^3/UL (ref 0.1–1.4)
MONOCYTES NFR BLD AUTO: 10.3 % (ref 3–13)
NEUTROPHILS # BLD AUTO: 3.5 10^3/UL (ref 1.7–8.2)
NEUTS SEG NFR BLD AUTO: 59.7 % (ref 42–78)
PLATELET # BLD: 236 10^3/UL (ref 150–450)
POTASSIUM SERPL-SCNC: 4.8 MMOL/L (ref 3.6–5)
RBC # BLD AUTO: 4.49 10^6/UL (ref 4.35–5.55)
TOTAL CELLS COUNTED % (AUTO): 100 %
WBC # BLD AUTO: 5.9 10^3/UL (ref 4–10.5)

## 2020-09-28 RX ADMIN — ASPIRIN SCH MG: 81 TABLET, COATED ORAL at 10:31

## 2020-09-28 RX ADMIN — METHYLPREDNISOLONE SODIUM SUCCINATE SCH MG: 125 INJECTION, POWDER, FOR SOLUTION INTRAMUSCULAR; INTRAVENOUS at 10:32

## 2020-09-28 RX ADMIN — METHYLPREDNISOLONE SODIUM SUCCINATE SCH MG: 125 INJECTION, POWDER, FOR SOLUTION INTRAMUSCULAR; INTRAVENOUS at 17:21

## 2020-09-28 RX ADMIN — PHENOBARBITAL SCH MG: 32.4 TABLET ORAL at 10:31

## 2020-09-28 RX ADMIN — SODIUM CHLORIDE PRN MLS/HR: 9 INJECTION, SOLUTION INTRAVENOUS at 17:20

## 2020-09-28 RX ADMIN — ENOXAPARIN SODIUM SCH MG: 40 INJECTION SUBCUTANEOUS at 10:32

## 2020-09-28 RX ADMIN — SODIUM POLYSTYRENE SULFONATE SCH GM: 15 SUSPENSION ORAL; RECTAL at 01:28

## 2020-09-28 RX ADMIN — LISINOPRIL SCH MG: 10 TABLET ORAL at 10:32

## 2020-09-28 RX ADMIN — GABAPENTIN SCH MG: 300 CAPSULE ORAL at 13:26

## 2020-09-28 RX ADMIN — LEVOFLOXACIN SCH MLS/HR: 750 INJECTION, SOLUTION INTRAVENOUS at 17:20

## 2020-09-28 RX ADMIN — GABAPENTIN SCH MG: 300 CAPSULE ORAL at 22:25

## 2020-09-28 RX ADMIN — SIMVASTATIN SCH MG: 10 TABLET, FILM COATED ORAL at 22:24

## 2020-09-28 RX ADMIN — METHYLPREDNISOLONE SODIUM SUCCINATE SCH MG: 125 INJECTION, POWDER, FOR SOLUTION INTRAMUSCULAR; INTRAVENOUS at 01:28

## 2020-09-28 RX ADMIN — GABAPENTIN SCH MG: 300 CAPSULE ORAL at 05:39

## 2020-09-28 RX ADMIN — EXTENDED PHENYTOIN SODIUM SCH MG: 100 CAPSULE ORAL at 22:25

## 2020-09-28 RX ADMIN — Medication SCH TAB: at 10:31

## 2020-09-28 RX ADMIN — EXTENDED PHENYTOIN SODIUM SCH MG: 100 CAPSULE ORAL at 10:32

## 2020-09-28 RX ADMIN — PHENOBARBITAL SCH MG: 32.4 TABLET ORAL at 22:25

## 2020-09-29 LAB
ADD MANUAL DIFF: NO
ALBUMIN SERPL-MCNC: 4.5 G/DL (ref 3.5–5)
ALP SERPL-CCNC: 141 U/L (ref 38–126)
ANION GAP SERPL CALC-SCNC: 13 MMOL/L (ref 5–19)
AST SERPL-CCNC: 27 U/L (ref 17–59)
BASOPHILS # BLD AUTO: 0.1 10^3/UL (ref 0–0.2)
BASOPHILS NFR BLD AUTO: 1.2 % (ref 0–2)
BILIRUB DIRECT SERPL-MCNC: 0.4 MG/DL (ref 0–0.4)
BILIRUB SERPL-MCNC: 0.5 MG/DL (ref 0.2–1.3)
BUN SERPL-MCNC: 29 MG/DL (ref 7–20)
CALCIUM: 9.7 MG/DL (ref 8.4–10.2)
CHLORIDE SERPL-SCNC: 101 MMOL/L (ref 98–107)
CO2 SERPL-SCNC: 27 MMOL/L (ref 22–30)
EOSINOPHIL # BLD AUTO: 0.1 10^3/UL (ref 0–0.6)
EOSINOPHIL NFR BLD AUTO: 1.6 % (ref 0–6)
ERYTHROCYTE [DISTWIDTH] IN BLOOD BY AUTOMATED COUNT: 14.3 % (ref 11.5–14)
GLUCOSE SERPL-MCNC: 104 MG/DL (ref 75–110)
HCT VFR BLD CALC: 44.9 % (ref 37.9–51)
HGB BLD-MCNC: 14.6 G/DL (ref 13.5–17)
LYMPHOCYTES # BLD AUTO: 2.3 10^3/UL (ref 0.5–4.7)
LYMPHOCYTES NFR BLD AUTO: 41.6 % (ref 13–45)
MCH RBC QN AUTO: 28.8 PG (ref 27–33.4)
MCHC RBC AUTO-ENTMCNC: 32.5 G/DL (ref 32–36)
MCV RBC AUTO: 89 FL (ref 80–97)
MONOCYTES # BLD AUTO: 0.4 10^3/UL (ref 0.1–1.4)
MONOCYTES NFR BLD AUTO: 7.4 % (ref 3–13)
NEUTROPHILS # BLD AUTO: 2.7 10^3/UL (ref 1.7–8.2)
NEUTS SEG NFR BLD AUTO: 48.2 % (ref 42–78)
PHOSPHATE SERPL-MCNC: 4.3 MG/DL (ref 2.5–4.5)
PLATELET # BLD: 250 10^3/UL (ref 150–450)
POTASSIUM SERPL-SCNC: 4.5 MMOL/L (ref 3.6–5)
PROT SERPL-MCNC: 8.7 G/DL (ref 6.3–8.2)
RBC # BLD AUTO: 5.07 10^6/UL (ref 4.35–5.55)
TOTAL CELLS COUNTED % (AUTO): 100 %
WBC # BLD AUTO: 5.5 10^3/UL (ref 4–10.5)

## 2020-09-29 RX ADMIN — GABAPENTIN SCH MG: 300 CAPSULE ORAL at 21:08

## 2020-09-29 RX ADMIN — ENOXAPARIN SODIUM SCH MG: 40 INJECTION SUBCUTANEOUS at 09:53

## 2020-09-29 RX ADMIN — EXTENDED PHENYTOIN SODIUM SCH MG: 100 CAPSULE ORAL at 21:08

## 2020-09-29 RX ADMIN — LISINOPRIL SCH MG: 10 TABLET ORAL at 09:53

## 2020-09-29 RX ADMIN — PHENOBARBITAL SCH MG: 32.4 TABLET ORAL at 21:08

## 2020-09-29 RX ADMIN — SIMVASTATIN SCH MG: 10 TABLET, FILM COATED ORAL at 21:07

## 2020-09-29 RX ADMIN — PHENOBARBITAL SCH MG: 32.4 TABLET ORAL at 09:55

## 2020-09-29 RX ADMIN — GABAPENTIN SCH MG: 300 CAPSULE ORAL at 05:21

## 2020-09-29 RX ADMIN — METHYLPREDNISOLONE SODIUM SUCCINATE SCH MG: 125 INJECTION, POWDER, FOR SOLUTION INTRAMUSCULAR; INTRAVENOUS at 09:53

## 2020-09-29 RX ADMIN — Medication SCH TAB: at 09:53

## 2020-09-29 RX ADMIN — EXTENDED PHENYTOIN SODIUM SCH MG: 100 CAPSULE ORAL at 09:53

## 2020-09-29 RX ADMIN — LEVOFLOXACIN SCH MG: 750 TABLET, FILM COATED ORAL at 21:08

## 2020-09-29 RX ADMIN — ASPIRIN SCH MG: 81 TABLET, COATED ORAL at 09:54

## 2020-09-29 RX ADMIN — METHYLPREDNISOLONE SODIUM SUCCINATE SCH: 125 INJECTION, POWDER, FOR SOLUTION INTRAMUSCULAR; INTRAVENOUS at 03:09

## 2020-09-29 RX ADMIN — METHYLPREDNISOLONE SODIUM SUCCINATE SCH MG: 125 INJECTION, POWDER, FOR SOLUTION INTRAMUSCULAR; INTRAVENOUS at 17:32

## 2020-09-29 RX ADMIN — GABAPENTIN SCH MG: 300 CAPSULE ORAL at 14:35

## 2020-09-29 NOTE — PDOC PROGRESS REPORT
Subjective


Progress Note for:: 09/29/20


Subjective:: 





Patient reported improvement in his breathing. No chest pain. No fever or 

chills. No nausea, vomiting, or abdominal pain. His COVID-19 test was reported 

not detected.


Reason For Visit: 


COPD EXACERVATION,PUI FOR COVID








Physical Exam


Vital Signs: 


                                        











Temp Pulse Resp BP Pulse Ox


 


 97.2 F   56 L  20   119/71   98 


 


 09/29/20 15:38  09/29/20 15:38  09/29/20 15:38  09/29/20 15:38  09/29/20 15:38








                                 Intake & Output











 09/28/20 09/29/20 09/30/20





 06:59 06:59 06:59


 


Intake Total 990 1500 830


 


Balance 990 1500 830


 


Weight 92.4 kg 92.4 kg 











Physical Exam: 


General appearance: PRESENT: mild distress on 2L/min supplemental oxygen via 

nasal cannula


Head exam: PRESENT: atraumatic, normocephalic


Eye exam: PRESENT: conjunctiva pink.  ABSENT: pallor, sclera icterus


Respiratory exam: PRESENT: minimal rhonchi, scattered crackles


Cardiovascular exam: PRESENT: RRR, +S1, +S2.  ABSENT: diastolic murmur, rubs, 

systolic murmur


GI/Abdominal exam: PRESENT: normal bowel sounds, soft.  ABSENT: distended, 

guarding, mass, organomegaly, rebound, tenderness


Extremities exam: ABSENT: pedal edema


Neurological exam: PRESENT: alert, awake, oriented to person, oriented to place,

oriented to time, oriented to situation, CN II-XII grossly intact.  ABSENT: 

motor sensory deficit


Psychiatric exam: PRESENT: appropriate affect, normal mood.  ABSENT: homicidal 

ideation, suicidal ideation


Skin exam: PRESENT: dry, warm





Results


Laboratory Results: 


                                        





                                 09/29/20 06:08 





                                 09/29/20 06:08 





                                        











  09/29/20 09/29/20





  06:08 06:08


 


WBC   5.5


 


RBC   5.07


 


Hgb   14.6


 


Hct   44.9


 


MCV   89


 


MCH   28.8


 


MCHC   32.5


 


RDW   14.3 H


 


Plt Count   250


 


Seg Neutrophils %   48.2


 


Sodium  141.0 


 


Potassium  4.5 


 


Chloride  101 


 


Carbon Dioxide  27 


 


Anion Gap  13 


 


BUN  29 H 


 


Creatinine  1.40 H 


 


Est GFR (African Amer)  > 60 


 


Glucose  104 


 


Calcium  9.7 


 


Phosphorus  4.3 


 


Magnesium  2.3 


 


Total Bilirubin  0.5 


 


AST  27 


 


Alkaline Phosphatase  141 H 


 


Total Protein  8.7 H 


 


Albumin  4.5 








                                        





09/26/20 09:33   Clean Catch Midstream   Urine Culture - Final


                            Staph Coagulase Negative





                                        











  09/26/20 09/26/20 09/26/20





  07:49 18:25 18:25


 


Creatine Kinase    71


 


CK-MB (CK-2)   


 


Troponin I  < 0.012  


 


NT-Pro-B Natriuret Pep  166 H  223 H 














  09/26/20 09/27/20 09/27/20





  18:25 00:45 00:45


 


Creatine Kinase   81 


 


CK-MB (CK-2)  1.58   1.53


 


Troponin I  < 0.012   < 0.012


 


NT-Pro-B Natriuret Pep   














  09/27/20 09/27/20





  06:51 06:51


 


Creatine Kinase  82 


 


CK-MB (CK-2)   1.39


 


Troponin I   < 0.012


 


NT-Pro-B Natriuret Pep  











Impressions: 


                                        





Chest CT  09/26/20 00:00


IMPRESSION:  No acute findings. Small right pleural effusion and basilar 

subsegmental atelectasis -scarring, chronic.


 








Chest X-Ray  09/26/20 07:36


IMPRESSION:  NO ACUTE RADIOGRAPHIC FINDING IN THE CHEST.


 














Assessment & Plan





- Diagnosis


(1) Acute hypercapnic respiratory failure


Is this a current diagnosis for this admission?: Yes   





(2) COPD exacerbation


Is this a current diagnosis for this admission?: Yes   





(3) HTN (hypertension)


Qualifiers: 


   Hypertension type: essential hypertension   Qualified Code(s): I10 - 

Essential (primary) hypertension   


Is this a current diagnosis for this admission?: Yes   





(4) HLD (hyperlipidemia)


Qualifiers: 


   Hyperlipidemia type: pure hypercholesterolemia   Qualified Code(s): E78.00 - 

Pure hypercholesterolemia, unspecified   


Is this a current diagnosis for this admission?: Yes   





(5) Seizure


Is this a current diagnosis for this admission?: Yes   





- Time


Time Spent with patient: 25-34 minutes


Level of Care: IMCU


Medications reviewed and adjusted accordingly: Yes


Anticipated discharge: Home with Homehealth


Anticipated DC Timeframe: within 72 hours





- Inpatient Certification


Based on my medical assessment, after consideration of the patient's 

comorbidities, presenting symptoms, or acuity I expect that the services needed 

warrant INPATIENT care.: Yes


I certify that my determination is in accordance with my understanding of 

Medicare's requirements for reasonable and necessary INPATIENT services [42 CFR 

412.3e].: Yes


Medical Necessity: Significant Comorbidiites Make Outpatient Treatment Too 

Risky, Need Close Monitoring Due to Risk of Patient Decompensation, Need For IV 

Fluids, Need For Continuous Telemetry Monitoring, Need for Nebulizer Therapy and

Monitoring of Response, Need for IV Antibiotics, Risk of Complication if Not 

Cared For in Hospital, Risk of Diagnosis Which Will Require Inpatient Eval/Care

/Monitoring


Post Hospital Care: D/C Planner Documentation





- Plan Summary


Plan Summary: 





Continue current medication management. Follow up on blood culture findings.

## 2020-09-29 NOTE — PDOC PROGRESS REPORT
Subjective


Progress Note for:: 09/28/20


Subjective:: 





Breathing is getting better but still wheezing. No chest pain or palpitation. No

fever or chills. No nausea, vomiting, or abdominal pain. Remain in isolation as 

PUI for COVID-19 infection.


Reason For Visit: 


COPD EXACERVATION,PUI FOR COVID








Physical Exam


Vital Signs: 


                                        











Temp Pulse Resp BP Pulse Ox


 


 97.9 F   64   23 H  143/66 H  98 


 


 09/28/20 12:00  09/28/20 14:00  09/28/20 12:00  09/28/20 12:00  09/28/20 12:00








                                 Intake & Output











 09/27/20 09/28/20 09/29/20





 06:59 06:59 06:59


 


Intake Total 


 


Balance 


 


Weight 87.9 kg 92.4 kg 











General appearance: PRESENT: mild distress - with audibke expiratory wheezing


Head exam: PRESENT: atraumatic, normocephalic


Eye exam: PRESENT: conjunctiva pink.  ABSENT: scleral icterus


Respiratory exam: PRESENT: prolonged expiratory phas, rhonchi


Cardiovascular exam: PRESENT: RRR, +S1, +S2.  ABSENT: diastolic murmur, rubs, 

systolic murmur


Vascular exam: ABSENT: pallor


GI/Abdominal exam: PRESENT: normal bowel sounds, soft.  ABSENT: distended, 

guarding, mass, organolmegaly, rebound, tenderness


Extremities exam: ABSENT: pedal edema


Neurological exam: PRESENT: alert, awake, oriented to person, oriented to place,

oriented to time, oriented to situation, CN II-XII grossly intact.  ABSENT: 

motor sensory deficit


Psychiatric exam: PRESENT: appropriate affect, normal mood.  ABSENT: homicidal 

ideation, suicidal ideation


Skin exam: PRESENT: dry, warm





Results


Laboratory Results: 


                                        





                                 09/28/20 14:40 





                                 09/28/20 14:40 





                                        











  09/27/20 09/28/20 09/28/20





  19:15 08:35 14:40


 


WBC    5.9


 


RBC    4.49


 


Hgb    13.2 L


 


Hct    39.6


 


MCV    88


 


MCH    29.4


 


MCHC    33.3


 


RDW    14.1 H


 


Plt Count    236


 


Seg Neutrophils %    59.7


 


Sodium  136.1 L  Cancelled 


 


Potassium  6.0 H*  Cancelled 


 


Chloride  99  Cancelled 


 


Carbon Dioxide  26  Cancelled 


 


Anion Gap  11  Cancelled 


 


BUN  33 H  Cancelled 


 


Creatinine  1.82 H  Cancelled 


 


Est GFR ( Amer)  45 L  Cancelled 


 


Est GFR (Non-Af Amer)   Cancelled 


 


Glucose  140 H  Cancelled 


 


Calcium  9.8  Cancelled 














  09/28/20





  14:40


 


WBC 


 


RBC 


 


Hgb 


 


Hct 


 


MCV 


 


MCH 


 


MCHC 


 


RDW 


 


Plt Count 


 


Seg Neutrophils % 


 


Sodium  137.5


 


Potassium  4.8  D


 


Chloride  99


 


Carbon Dioxide  31 H


 


Anion Gap  8


 


BUN  27 H


 


Creatinine  1.43 H


 


Est GFR ( Amer)  59 L


 


Est GFR (Non-Af Amer) 


 


Glucose  107


 


Calcium  9.5








                                        











  09/26/20 09/26/20 09/26/20





  07:49 18:25 18:25


 


Creatine Kinase    71


 


CK-MB (CK-2)   


 


Troponin I  < 0.012  


 


NT-Pro-B Natriuret Pep  166 H  223 H 














  09/26/20 09/27/20 09/27/20





  18:25 00:45 00:45


 


Creatine Kinase   81 


 


CK-MB (CK-2)  1.58   1.53


 


Troponin I  < 0.012   < 0.012


 


NT-Pro-B Natriuret Pep   














  09/27/20 09/27/20





  06:51 06:51


 


Creatine Kinase  82 


 


CK-MB (CK-2)   1.39


 


Troponin I   < 0.012


 


NT-Pro-B Natriuret Pep  











Impressions: 


                                        





Chest CT  09/26/20 00:00


IMPRESSION:  No acute findings. Small right pleural effusion and basilar 

subsegmental atelectasis -scarring, chronic.


 








Chest X-Ray  09/26/20 07:36


IMPRESSION:  NO ACUTE RADIOGRAPHIC FINDING IN THE CHEST.


 














Assessment & Plan





- Diagnosis


(1) Acute hypercapnic respiratory failure


Is this a current diagnosis for this admission?: Yes   


Plan: 


Continue current medication  management and supplemental oxygen. Follow up on 

COVID-19 testing result.








(2) COPD exacerbation


Is this a current diagnosis for this admission?: Yes   


Plan: 


Maintain on IV Solu Medrol therapy and bronchodilators therapy.








(3) HTN (hypertension)


Qualifiers: 


   Hypertension type: essential hypertension   Qualified Code(s): I10 - 

Essential (primary) hypertension   


Is this a current diagnosis for this admission?: Yes   


Plan: 


Maintain on current medication and dietary restriction management.








(4) HLD (hyperlipidemia)


Qualifiers: 


   Hyperlipidemia type: pure hypercholesterolemia   Qualified Code(s): E78.00 - 

Pure hypercholesterolemia, unspecified   


Is this a current diagnosis for this admission?: Yes   


Plan: 


Maintain on current medication and dietary restriction management.








(5) Seizure


Is this a current diagnosis for this admission?: Yes   


Plan: 


Maintain on current medication and dietary restriction management.








- Time


Time Spent with patient: 25-34 minutes


Level of Care: IMCU


Medications reviewed and adjusted accordingly: Yes


Anticipated discharge: Home with Homehealth


Anticipated DC Timeframe: within 72 hours





- Inpatient Certification


Based on my medical assessment, after consideration of the patient's 

comorbidities, presenting symptoms, or acuity I expect that the services needed 

warrant INPATIENT care.: Yes


I certify that my determination is in accordance with my understanding of 

Medicare's requirements for reasonable and necessary INPATIENT services [42 CFR 

412.3e].: Yes


Medical Necessity: Significant Comorbidiites Make Outpatient Treatment Too 

Risky, Need Close Monitoring Due to Risk of Patient Decompensation, Need For IV 

Fluids, Need For Continuous Telemetry Monitoring, Need for Nebulizer Therapy and

Monitoring of Response, Need for IV Antibiotics, Risk of Complication if Not 

Cared For in Hospital, Risk of Diagnosis Which Will Require Inpatient 

Eval/Care/Monitoring


Post Hospital Care: D/C Planner Documentation





- Plan Summary


Plan Summary: 





Continue current therapy with antibiotic coverage. Follow up on COVID-19 

findings.

## 2020-09-30 RX ADMIN — GABAPENTIN SCH MG: 300 CAPSULE ORAL at 14:15

## 2020-09-30 RX ADMIN — PHENOBARBITAL SCH MG: 32.4 TABLET ORAL at 09:33

## 2020-09-30 RX ADMIN — SODIUM CHLORIDE PRN MLS/HR: 9 INJECTION, SOLUTION INTRAVENOUS at 19:40

## 2020-09-30 RX ADMIN — EXTENDED PHENYTOIN SODIUM SCH MG: 100 CAPSULE ORAL at 23:02

## 2020-09-30 RX ADMIN — METHYLPREDNISOLONE SODIUM SUCCINATE SCH MG: 125 INJECTION, POWDER, FOR SOLUTION INTRAMUSCULAR; INTRAVENOUS at 01:03

## 2020-09-30 RX ADMIN — ENOXAPARIN SODIUM SCH MG: 40 INJECTION SUBCUTANEOUS at 09:32

## 2020-09-30 RX ADMIN — METHYLPREDNISOLONE SODIUM SUCCINATE SCH MG: 125 INJECTION, POWDER, FOR SOLUTION INTRAMUSCULAR; INTRAVENOUS at 17:23

## 2020-09-30 RX ADMIN — GABAPENTIN SCH MG: 300 CAPSULE ORAL at 05:06

## 2020-09-30 RX ADMIN — Medication SCH TAB: at 09:33

## 2020-09-30 RX ADMIN — SIMVASTATIN SCH MG: 10 TABLET, FILM COATED ORAL at 23:02

## 2020-09-30 RX ADMIN — LISINOPRIL SCH MG: 10 TABLET ORAL at 09:33

## 2020-09-30 RX ADMIN — ASPIRIN SCH MG: 81 TABLET, COATED ORAL at 09:32

## 2020-09-30 RX ADMIN — EXTENDED PHENYTOIN SODIUM SCH MG: 100 CAPSULE ORAL at 09:32

## 2020-09-30 RX ADMIN — LEVOFLOXACIN SCH MG: 750 TABLET, FILM COATED ORAL at 23:04

## 2020-09-30 RX ADMIN — PHENOBARBITAL SCH MG: 32.4 TABLET ORAL at 23:03

## 2020-09-30 RX ADMIN — SODIUM CHLORIDE PRN MLS/HR: 9 INJECTION, SOLUTION INTRAVENOUS at 01:03

## 2020-09-30 RX ADMIN — METHYLPREDNISOLONE SODIUM SUCCINATE SCH MG: 125 INJECTION, POWDER, FOR SOLUTION INTRAMUSCULAR; INTRAVENOUS at 09:33

## 2020-09-30 RX ADMIN — GABAPENTIN SCH MG: 300 CAPSULE ORAL at 23:02

## 2020-09-30 NOTE — PDOC PROGRESS REPORT
Subjective


Progress Note for:: 09/30/20


Subjective:: 





Patient reported that he is breathing better. No chest pain. No fever or chills.

No nausea, vomiting, or abdominal pain. 


Reason For Visit: 


COPD EXACERVATION,PUI FOR COVID








Physical Exam


Vital Signs: 


                                        











Temp Pulse Resp BP Pulse Ox


 


 97.8 F   81   19   95/68 L  98 


 


 09/30/20 16:58  09/30/20 16:58  09/30/20 16:58  09/30/20 16:58  09/30/20 16:58








                                 Intake & Output











 09/29/20 09/30/20 10/01/20





 06:59 06:59 06:59


 


Intake Total 1500 2667 680


 


Output Total  900 480


 


Balance 1500 1767 200


 


Weight 92.4 kg 92.4 kg 











Physical Exam: 


General appearance: PRESENT: mild distress with intermittent use of supplemental

oxygen via nasal cannula


Head exam: PRESENT: atraumatic, normocephalic


Eye exam: PRESENT: conjunctiva pink.  ABSENT: pallor, sclera icterus


Respiratory exam: PRESENT: minimal expiratory rhonchi


Cardiovascular exam: PRESENT: RRR, +S1, +S2.  ABSENT: diastolic murmur, rubs, 

systolic murmur


GI/Abdominal exam: PRESENT: normal bowel sounds, soft.  ABSENT: distended, 

guarding, mass, organomegaly, rebound, tenderness


Extremities exam: ABSENT: pedal edema


Neurological exam: PRESENT: alert, awake, oriented to person, oriented to place,

oriented to time, oriented to situation, CN II-XII grossly intact.  ABSENT: 

motor sensory deficit


Psychiatric exam: PRESENT: appropriate affect, normal mood.  ABSENT: homicidal 

ideation, suicidal ideation


Skin exam: PRESENT: dry, warm





Results


Laboratory Results: 


                                        





                                 09/29/20 06:08 





                                 09/29/20 06:08 





                                        











  09/26/20 09/26/20 09/26/20





  07:49 18:25 18:25


 


Creatine Kinase    71


 


CK-MB (CK-2)   


 


Troponin I  < 0.012  


 


NT-Pro-B Natriuret Pep  166 H  223 H 














  09/26/20 09/27/20 09/27/20





  18:25 00:45 00:45


 


Creatine Kinase   81 


 


CK-MB (CK-2)  1.58   1.53


 


Troponin I  < 0.012   < 0.012


 


NT-Pro-B Natriuret Pep   














  09/27/20 09/27/20





  06:51 06:51


 


Creatine Kinase  82 


 


CK-MB (CK-2)   1.39


 


Troponin I   < 0.012


 


NT-Pro-B Natriuret Pep  











Impressions: 


                                        





Chest CT  09/26/20 00:00


IMPRESSION:  No acute findings. Small right pleural effusion and basilar 

subsegmental atelectasis -scarring, chronic.


 








Chest X-Ray  09/26/20 07:36


IMPRESSION:  NO ACUTE RADIOGRAPHIC FINDING IN THE CHEST.


 














Assessment & Plan





- Diagnosis


(1) Acute hypercapnic respiratory failure


Is this a current diagnosis for this admission?: Yes   





(2) COPD exacerbation


Is this a current diagnosis for this admission?: Yes   





(3) HTN (hypertension)


Qualifiers: 


   Hypertension type: essential hypertension   Qualified Code(s): I10 - Essenti

al (primary) hypertension   


Is this a current diagnosis for this admission?: Yes   





(4) HLD (hyperlipidemia)


Qualifiers: 


   Hyperlipidemia type: pure hypercholesterolemia   Qualified Code(s): E78.00 - 

Pure hypercholesterolemia, unspecified   


Is this a current diagnosis for this admission?: Yes   





(5) Seizure


Is this a current diagnosis for this admission?: Yes   





- Time


Time Spent with patient: 25-34 minutes


Level of Care: IMCU


Medications reviewed and adjusted accordingly: Yes


Anticipated discharge: Home


Anticipated DC Timeframe: within 72 hours





- Inpatient Certification


Based on my medical assessment, after consideration of the patient's 

comorbidities, presenting symptoms, or acuity I expect that the services needed 

warrant INPATIENT care.: Yes


I certify that my determination is in accordance with my understanding of 

Medicare's requirements for reasonable and necessary INPATIENT services [42 CFR 

412.3e].: Yes


Medical Necessity: Significant Comorbidiites Make Outpatient Treatment Too 

Risky, Need Close Monitoring Due to Risk of Patient Decompensation, Need For IV 

Fluids, Need For Continuous Telemetry Monitoring, Need for Nebulizer Therapy and

Monitoring of Response, Need for IV Antibiotics, Risk of Complication if Not 

Cared For in Hospital, Risk of Diagnosis Which Will Require Inpatient Eval/Car

e/Monitoring


Post Hospital Care: D/C Planner Documentation





- Plan Summary


Plan Summary: 





Decrease IV Solu Medrol to 80 mg q8 hours. Continue all other current medication

management.

## 2020-10-01 RX ADMIN — ASPIRIN SCH MG: 81 TABLET, COATED ORAL at 09:38

## 2020-10-01 RX ADMIN — ENOXAPARIN SODIUM SCH MG: 40 INJECTION SUBCUTANEOUS at 09:38

## 2020-10-01 RX ADMIN — EXTENDED PHENYTOIN SODIUM SCH MG: 100 CAPSULE ORAL at 21:38

## 2020-10-01 RX ADMIN — GABAPENTIN SCH MG: 300 CAPSULE ORAL at 21:38

## 2020-10-01 RX ADMIN — IPRATROPIUM BROMIDE AND ALBUTEROL SULFATE PRN ML: 2.5; .5 SOLUTION RESPIRATORY (INHALATION) at 11:06

## 2020-10-01 RX ADMIN — Medication SCH TAB: at 09:37

## 2020-10-01 RX ADMIN — PHENOBARBITAL SCH MG: 32.4 TABLET ORAL at 21:38

## 2020-10-01 RX ADMIN — GABAPENTIN SCH MG: 300 CAPSULE ORAL at 15:14

## 2020-10-01 RX ADMIN — LEVOFLOXACIN SCH MG: 750 TABLET, FILM COATED ORAL at 21:38

## 2020-10-01 RX ADMIN — SIMVASTATIN SCH MG: 10 TABLET, FILM COATED ORAL at 21:38

## 2020-10-01 RX ADMIN — EXTENDED PHENYTOIN SODIUM SCH MG: 100 CAPSULE ORAL at 09:38

## 2020-10-01 RX ADMIN — PHENOBARBITAL SCH MG: 32.4 TABLET ORAL at 09:38

## 2020-10-01 RX ADMIN — IPRATROPIUM BROMIDE AND ALBUTEROL SULFATE PRN ML: 2.5; .5 SOLUTION RESPIRATORY (INHALATION) at 20:34

## 2020-10-01 RX ADMIN — LISINOPRIL SCH MG: 10 TABLET ORAL at 09:38

## 2020-10-01 RX ADMIN — SODIUM CHLORIDE PRN MLS/HR: 9 INJECTION, SOLUTION INTRAVENOUS at 09:37

## 2020-10-01 RX ADMIN — METHYLPREDNISOLONE SODIUM SUCCINATE SCH MG: 40 INJECTION, POWDER, FOR SOLUTION INTRAMUSCULAR; INTRAVENOUS at 17:58

## 2020-10-01 RX ADMIN — METHYLPREDNISOLONE SODIUM SUCCINATE SCH MG: 125 INJECTION, POWDER, FOR SOLUTION INTRAMUSCULAR; INTRAVENOUS at 09:38

## 2020-10-01 RX ADMIN — GABAPENTIN SCH MG: 300 CAPSULE ORAL at 05:46

## 2020-10-01 RX ADMIN — IPRATROPIUM BROMIDE AND ALBUTEROL SULFATE PRN ML: 2.5; .5 SOLUTION RESPIRATORY (INHALATION) at 01:17

## 2020-10-01 RX ADMIN — SODIUM CHLORIDE PRN MLS/HR: 9 INJECTION, SOLUTION INTRAVENOUS at 21:38

## 2020-10-01 RX ADMIN — METHYLPREDNISOLONE SODIUM SUCCINATE SCH MG: 125 INJECTION, POWDER, FOR SOLUTION INTRAMUSCULAR; INTRAVENOUS at 01:20

## 2020-10-01 NOTE — PDOC PROGRESS REPORT
Subjective


Progress Note for:: 10/01/20


Subjective:: 


No chest pain or difficulty with breathing. No fever or chills. No nausea, 

vomiting, or abdominal pain. Tolerating oral feeding.


Reason For Visit: 


COPD EXACERVATION,PUI FOR COVID








Physical Exam


Vital Signs: 


                                        











Temp Pulse Resp BP Pulse Ox


 


 97.6 F   71   18   115/55 L  94 


 


 10/01/20 16:33  10/01/20 16:33  10/01/20 16:33  10/01/20 16:33  10/01/20 16:33








                                 Intake & Output











 09/30/20 10/01/20 10/02/20





 06:59 06:59 06:59


 


Intake Total 2667 1902 1917


 


Output Total 900 1630 450


 


Balance 9322 866 3915


 


Weight 92.4 kg 92.4 kg 











Physical Exam: 


General appearance: PRESENT: mild distress with intermittent use of supplemental

oxygen via nasal cannula


Head exam: PRESENT: atraumatic, normocephalic


Eye exam: PRESENT: conjunctiva pink.  ABSENT: pallor, sclera icterus


Respiratory exam: PRESENT: minimal end expiratory phase rhonchi


Cardiovascular exam: PRESENT: RRR, +S1, +S2.  ABSENT: diastolic murmur, rubs, 

systolic murmur


GI/Abdominal exam: PRESENT: normal bowel sounds, soft.  ABSENT: distended, 

guarding, mass, organomegaly, rebound, tenderness


Extremities exam: ABSENT: pedal edema


Neurological exam: PRESENT: alert, awake, oriented to person, oriented to place,

oriented to time, oriented to situation, CN II-XII grossly intact.  ABSENT: 

motor sensory deficit


Psychiatric exam: PRESENT: appropriate affect, normal mood.  ABSENT: homicidal 

ideation, suicidal ideation


Skin exam: PRESENT: dry, warm





Results


Laboratory Results: 


                                        





                                 09/29/20 06:08 





                                 09/29/20 06:08 





                                        





09/26/20 07:49   Blood   Blood Culture - Final


                            NO GROWTH IN 5 DAYS





                                        











  09/26/20 09/26/20 09/26/20





  07:49 18:25 18:25


 


Creatine Kinase    71


 


CK-MB (CK-2)   


 


Troponin I  < 0.012  


 


NT-Pro-B Natriuret Pep  166 H  223 H 














  09/26/20 09/27/20 09/27/20





  18:25 00:45 00:45


 


Creatine Kinase   81 


 


CK-MB (CK-2)  1.58   1.53


 


Troponin I  < 0.012   < 0.012


 


NT-Pro-B Natriuret Pep   














  09/27/20 09/27/20





  06:51 06:51


 


Creatine Kinase  82 


 


CK-MB (CK-2)   1.39


 


Troponin I   < 0.012


 


NT-Pro-B Natriuret Pep  











Impressions: 


                                        





Chest CT  09/26/20 00:00


IMPRESSION:  No acute findings. Small right pleural effusion and basilar 

subsegmental atelectasis -scarring, chronic.


 








Chest X-Ray  09/26/20 07:36


IMPRESSION:  NO ACUTE RADIOGRAPHIC FINDING IN THE CHEST.


 














Assessment & Plan





- Diagnosis


(1) Acute hypercapnic respiratory failure


Is this a current diagnosis for this admission?: Yes   





(2) COPD exacerbation


Is this a current diagnosis for this admission?: Yes   





(3) HTN (hypertension)


Qualifiers: 


   Hypertension type: essential hypertension   Qualified Code(s): I10 - 

Essential (primary) hypertension   


Is this a current diagnosis for this admission?: Yes   





(4) HLD (hyperlipidemia)


Qualifiers: 


   Hyperlipidemia type: pure hypercholesterolemia   Qualified Code(s): E78.00 - 

Pure hypercholesterolemia, unspecified   


Is this a current diagnosis for this admission?: Yes   





(5) Seizure


Is this a current diagnosis for this admission?: Yes   





- Time


Time Spent with patient: 25-34 minutes


Level of Care: IMCU


Anticipated discharge: Home


Anticipated DC Timeframe: within 72 hours





- Inpatient Certification


Based on my medical assessment, after consideration of the patient's 

comorbidities, presenting symptoms, or acuity I expect that the services needed 

warrant INPATIENT care.: Yes


I certify that my determination is in accordance with my understanding of 

Medicare's requirements for reasonable and necessary INPATIENT services [42 CFR 

412.3e].: Yes


Medical Necessity: Significant Comorbidiites Make Outpatient Treatment Too 

Risky, Need Close Monitoring Due to Risk of Patient Decompensation, Need For IV 

Fluids, Need For Continuous Telemetry Monitoring, Need for Nebulizer Therapy and

Monitoring of Response, Risk of Complication if Not Cared For in Hospital, Risk 

of Diagnosis Which Will Require Inpatient Eval/Care/Monitoring


Post Hospital Care: D/C Planner Documentation





- Plan Summary


Plan Summary: 





Decrease IV Solu Medrol to 40 mg q 8 hours. Maintain on all other current 

medication management.

## 2020-10-02 VITALS — SYSTOLIC BLOOD PRESSURE: 143 MMHG | DIASTOLIC BLOOD PRESSURE: 66 MMHG

## 2020-10-02 LAB
ADD MANUAL DIFF: NO
ALBUMIN SERPL-MCNC: 3.8 G/DL (ref 3.5–5)
ALP SERPL-CCNC: 100 U/L (ref 38–126)
ANION GAP SERPL CALC-SCNC: 10 MMOL/L (ref 5–19)
AST SERPL-CCNC: 23 U/L (ref 17–59)
BASOPHILS # BLD AUTO: 0 10^3/UL (ref 0–0.2)
BASOPHILS NFR BLD AUTO: 0.1 % (ref 0–2)
BILIRUB DIRECT SERPL-MCNC: 0.3 MG/DL (ref 0–0.4)
BILIRUB SERPL-MCNC: 0.3 MG/DL (ref 0.2–1.3)
BUN SERPL-MCNC: 20 MG/DL (ref 7–20)
CALCIUM: 8.9 MG/DL (ref 8.4–10.2)
CHLORIDE SERPL-SCNC: 103 MMOL/L (ref 98–107)
CO2 SERPL-SCNC: 27 MMOL/L (ref 22–30)
EOSINOPHIL # BLD AUTO: 0 10^3/UL (ref 0–0.6)
EOSINOPHIL NFR BLD AUTO: 0 % (ref 0–6)
ERYTHROCYTE [DISTWIDTH] IN BLOOD BY AUTOMATED COUNT: 13.8 % (ref 11.5–14)
ERYTHROCYTE [DISTWIDTH] IN BLOOD BY AUTOMATED COUNT: 14.3 % (ref 11.5–14)
GLUCOSE SERPL-MCNC: 108 MG/DL (ref 75–110)
HCT VFR BLD CALC: 38 % (ref 37.9–51)
HCT VFR BLD CALC: 38.2 % (ref 37.9–51)
HGB BLD-MCNC: 12.3 G/DL (ref 13.5–17)
HGB BLD-MCNC: 12.5 G/DL (ref 13.5–17)
LYMPHOCYTES # BLD AUTO: 1 10^3/UL (ref 0.5–4.7)
LYMPHOCYTES NFR BLD AUTO: 22.7 % (ref 13–45)
MCH RBC QN AUTO: 28.7 PG (ref 27–33.4)
MCH RBC QN AUTO: 29 PG (ref 27–33.4)
MCHC RBC AUTO-ENTMCNC: 32.3 G/DL (ref 32–36)
MCHC RBC AUTO-ENTMCNC: 32.8 G/DL (ref 32–36)
MCV RBC AUTO: 89 FL (ref 80–97)
MCV RBC AUTO: 89 FL (ref 80–97)
MONOCYTES # BLD AUTO: 0.3 10^3/UL (ref 0.1–1.4)
MONOCYTES NFR BLD AUTO: 6.3 % (ref 3–13)
NEUTROPHILS # BLD AUTO: 3.1 10^3/UL (ref 1.7–8.2)
NEUTS SEG NFR BLD AUTO: 70.9 % (ref 42–78)
PLATELET # BLD: 200 10^3/UL (ref 150–450)
PLATELET # BLD: 202 10^3/UL (ref 150–450)
POTASSIUM SERPL-SCNC: 5.2 MMOL/L (ref 3.6–5)
PROT SERPL-MCNC: 7.3 G/DL (ref 6.3–8.2)
RBC # BLD AUTO: 4.28 10^6/UL (ref 4.35–5.55)
RBC # BLD AUTO: 4.32 10^6/UL (ref 4.35–5.55)
TOTAL CELLS COUNTED % (AUTO): 100 %
WBC # BLD AUTO: 4.4 10^3/UL (ref 4–10.5)
WBC # BLD AUTO: 5.4 10^3/UL (ref 4–10.5)

## 2020-10-02 RX ADMIN — GABAPENTIN SCH MG: 300 CAPSULE ORAL at 05:18

## 2020-10-02 RX ADMIN — GABAPENTIN SCH MG: 300 CAPSULE ORAL at 14:15

## 2020-10-02 RX ADMIN — IPRATROPIUM BROMIDE AND ALBUTEROL SULFATE PRN ML: 2.5; .5 SOLUTION RESPIRATORY (INHALATION) at 09:50

## 2020-10-02 RX ADMIN — METHYLPREDNISOLONE SODIUM SUCCINATE SCH MG: 40 INJECTION, POWDER, FOR SOLUTION INTRAMUSCULAR; INTRAVENOUS at 09:38

## 2020-10-02 RX ADMIN — PHENOBARBITAL SCH MG: 32.4 TABLET ORAL at 09:38

## 2020-10-02 RX ADMIN — METHYLPREDNISOLONE SODIUM SUCCINATE SCH MG: 40 INJECTION, POWDER, FOR SOLUTION INTRAMUSCULAR; INTRAVENOUS at 00:59

## 2020-10-02 RX ADMIN — LISINOPRIL SCH MG: 10 TABLET ORAL at 09:38

## 2020-10-02 RX ADMIN — ENOXAPARIN SODIUM SCH MG: 40 INJECTION SUBCUTANEOUS at 09:38

## 2020-10-02 RX ADMIN — Medication SCH TAB: at 09:39

## 2020-10-02 RX ADMIN — ASPIRIN SCH MG: 81 TABLET, COATED ORAL at 09:39

## 2020-10-02 RX ADMIN — EXTENDED PHENYTOIN SODIUM SCH MG: 100 CAPSULE ORAL at 09:39

## 2020-10-02 NOTE — PDOC DISCHARGE SUMMARY
Impression





- Admit/DC Date/PCP


Admission Date/Primary Care Provider: 


  09/26/20 14:42





  RAPHAEL ZACARIAS





Discharge Date: 10/02/20





- Discharge Diagnosis


(1) Acute hypercapnic respiratory failure


Is this a current diagnosis for this admission?: Yes   





(2) COPD exacerbation


Is this a current diagnosis for this admission?: Yes   





(3) HTN (hypertension)


Is this a current diagnosis for this admission?: Yes   





(4) HLD (hyperlipidemia)


Is this a current diagnosis for this admission?: Yes   





(5) Seizure


Is this a current diagnosis for this admission?: Yes   





(6) Acute renal injury due to hypovolemia


Is this a current diagnosis for this admission?: Yes   





- Assessment


Summary: 


Patient was admitted for worsening shortness of breath with concern for possible

COVID-1 infection. He was reported negative for COVID-19 infection. He remain on

bronchodilators and IV Solu Medrol therapy along with IV Levofloxacin for 

exacerbated COPD. He responded well to treatment and transition to oral therapy.

His presenting acute kidney injury did resolved before discharge. He will be 

discharge home today and follow up in the office as instructed upon discharge.





- Additional Information


Resuscitation Status: Full Code


Discharge Diet: As Tolerated


Discharge Activity: Activity As Tolerated


Referrals: 


RAPHAEL ZACAIRAS MD [Primary Care Provider] - 10/07/20 9:00 am


Prescriptions: 


Ipratropium/Albuterol Sulfate [Combivent Respimat 4 gm Mdi] 1 puff IH Q6 #1 

aer.w.adap


Prednisone 10 mg PO ASDIR PRN #21 tablet


 PRN Reason: 


Home Medications: 








Aspirin [Ecotrin 81 mg EC Tablet] 81 mg PO DAILY 09/23/18 


Gabapentin [Neurontin] 600 mg PO Q8 09/23/18 


Lisinopril [Prinivil] 20 mg PO DAILY 09/23/18 


Phenobarbital [Phenobarbital 32.4 mg Tablet] 32.4 mg PO Q12 09/23/18 


Simvastatin [Zocor 20 mg Tablet] 20 mg PO QHS 09/23/18 


Acetylcyst/Jthlcpd60/Levomefol [Metafolbic Plus Caplet] 1 cap PO DAILY 09/26/20 


Phenytoin Sodium Extended [Dilantin 100 mg Capsule.er] 100 mg PO Q12 09/26/20 


Ubidecarenone/Vit E Acet [Co Q-10 100 mg Softgel] 1 each PO DAILY 09/26/20 


Umeclidinium Brm/Vilanterol Tr [Anoro Ellipta 62.5-25 Mcg INH] 1 each IH BID 

09/26/20 


Vit B1 Mn/B2/B3/B5/B6/B12/C/FA [B Complex with Vitamin C Tab] 1 each PO DAILY 

09/26/20 


Ipratropium/Albuterol Sulfate [Combivent Respimat 4 gm Mdi] 1 puff IH Q6 #1 

aer.w.adap 10/02/20 


Prednisone 10 mg PO ASDIR PRN #21 tablet 10/02/20 











History of Present Illiness


History of Present Illness: 


ANGY GILBERT is a 69 year old male, He has a history of COPD he came to 

emergency room for evaluation of cough and shortness of breath.  The cough is 

productive of sputum scant amount, no hemoptysis, no chest pain no fever or 

chills no vomiting, he denied any exposure to SARS-CoV-2.  In the emergency room

he was evaluated he was found to have labored breathing with the use of 

accessory muscles of breathing there was expiratory wheeze on auscultation of 

the lung field.The arterial blood gas on FiO2 of 9 L PO2 174.8, pH 7.31, PCO2 

61.2, bicarbonate 30.2 consistent with acute hypercapnic acidosis, will need to 

reduce the FiO2 of this patient he has COPD he needs  hypoxic drive to maintain 

his breathing








Hospital Course


Hospital Course: 


Patient was admitted for worsening shortness of breath with concern for possible

COVID-1 infection. He was reported negative for COVID-19 infection. He remain on

bronchodilators and IV Solu Medrol therapy along with IV Levofloxacin for 

exacerbated COPD. He responded well to treatment and transition to oral therapy.

His presenting acute kidney injury did resolved before discharge. He will be 

discharge home today and follow up in the office as instructed upon discharge.





Physical Exam


Vital Signs: 


                                        











Temp Pulse Resp BP Pulse Ox


 


 97.7 F   55 L  16   126/68 H  100 


 


 10/02/20 10:00  10/02/20 07:46  10/02/20 07:46  10/02/20 07:46  10/02/20 07:46








                                 Intake & Output











 10/01/20 10/02/20 10/03/20





 06:59 06:59 06:59


 


Intake Total 1902 3118 


 


Output Total 0656 6732 


 


Balance 272 -607 


 


Weight 92.4 kg 99.9 kg 

















General appearance: PRESENT: No acute distress


Head exam: PRESENT: atraumatic, normocephalic


Eye exam: PRESENT: conjunctiva pink.  ABSENT: pallor, sclera icterus


Respiratory exam: PRESENT: minimal end expiratory phase rhonchi


Cardiovascular exam: PRESENT: RRR, +S1, +S2.  ABSENT: diastolic murmur, rubs, 

systolic murmur


GI/Abdominal exam: PRESENT: normal bowel sounds, soft.  ABSENT: distended, 

guarding, mass, organomegaly, rebound, tenderness


Extremities exam: ABSENT: pedal edema


Neurological exam: PRESENT: alert, awake, oriented to person, oriented to place,

oriented to time, oriented to situation, CN II-XII grossly intact.  ABSENT: 

motor sensory deficit


Psychiatric exam: PRESENT: appropriate affect, normal mood.  ABSENT: homicidal 

ideation, suicidal ideation


Skin exam: PRESENT: dry, warm














Results


Laboratory Results: 


                                        











WBC  5.4 10^3/uL (4.0-10.5)   10/02/20  08:52    


 


RBC  4.32 10^6/uL (4.35-5.55)  L  10/02/20  08:52    


 


Hgb  12.5 g/dL (13.5-17.0)  L  10/02/20  08:52    


 


Hct  38.2 % (37.9-51.0)   10/02/20  08:52    


 


MCV  89 fl (80-97)   10/02/20  08:52    


 


MCH  29.0 pg (27.0-33.4)   10/02/20  08:52    


 


MCHC  32.8 g/dL (32.0-36.0)   10/02/20  08:52    


 


RDW  13.8 % (11.5-14.0)   10/02/20  08:52    


 


Plt Count  200 10^3/uL (150-450)   10/02/20  08:52    


 


Lymph % (Auto)  22.7 % (13-45)   10/02/20  05:42    


 


Mono % (Auto)  6.3 % (3-13)   10/02/20  05:42    


 


Eos % (Auto)  0.0 % (0-6)   10/02/20  05:42    


 


Baso % (Auto)  0.1 % (0-2)   10/02/20  05:42    


 


Absolute Neuts (auto)  3.1 10^3/uL (1.7-8.2)   10/02/20  05:42    


 


Absolute Lymphs (auto)  1.0 10^3/uL (0.5-4.7)   10/02/20  05:42    


 


Absolute Monos (auto)  0.3 10^3/uL (0.1-1.4)   10/02/20  05:42    


 


Absolute Eos (auto)  0.0 10^3/uL (0.0-0.6)   10/02/20  05:42    


 


Absolute Basos (auto)  0.0 10^3/uL (0.0-0.2)   10/02/20  05:42    


 


Seg Neutrophils %  70.9 % (42-78)   10/02/20  05:42    


 


PT  15.5 SEC (11.4-15.4)  H  09/26/20  18:25    


 


INR  1.21   09/26/20  18:25    


 


APTT  48.3 SEC (23.5-35.8)  H  09/26/20  18:25    


 


Carbonic Acid  1.84 mmol/L (1.05-1.35)  H  09/26/20  08:17    


 


HCO3/H2CO3 Ratio  16:1   09/26/20  08:17    


 


ABG pH  7.31  (7.35-7.45)  L  09/26/20  08:17    


 


ABG pCO2  61.2 mmHg (35-45)  H  09/26/20  08:17    


 


ABG pO2  174.8 mmHg ()  H  09/26/20  08:17    


 


ABG HCO3  30.2 mmol/L (20-24)  H  09/26/20  08:17    


 


ABG Total CO2  32.1 mmol/L (23-27)  H  09/26/20  08:17    


 


ABG O2 Saturation  99.0 % (94-98)  H  09/26/20  08:17    


 


ABG Base Excess  2.4 mmol/L  09/26/20  08:17    


 


FiO2  9L   09/26/20  08:17    


 


Sodium  139.9 mmol/L (137-145)   10/02/20  05:42    


 


Potassium  5.2 mmol/L (3.6-5.0)  H  10/02/20  05:42    


 


Chloride  103 mmol/L ()   10/02/20  05:42    


 


Carbon Dioxide  27 mmol/L (22-30)   10/02/20  05:42    


 


Anion Gap  10  (5-19)   10/02/20  05:42    


 


BUN  20 mg/dL (7-20)   10/02/20  05:42    


 


Creatinine  1.16 mg/dL (0.52-1.25)   10/02/20  05:42    


 


Est GFR ( Amer)  > 60  (>60)   10/02/20  05:42    


 


Est GFR (Non-Af Amer)  Cancelled   09/28/20  08:35    


 


Est GFR (MDRD) Non-Af  > 60  (>60)   10/02/20  05:42    


 


Glucose  108 mg/dL ()   10/02/20  05:42    


 


Hemoglobin A1c %  5.3 % (4.7-6.0)   09/27/20  06:51    


 


Calcium  8.9 mg/dL (8.4-10.2)   10/02/20  05:42    


 


Phosphorus  4.3 mg/dL (2.5-4.5)   09/29/20  06:08    


 


Magnesium  2.3 mg/dL (1.6-2.3)   09/29/20  06:08    


 


Total Bilirubin  0.3 mg/dL (0.2-1.3)   10/02/20  05:42    


 


Direct Bilirubin  0.3 mg/dL (0.0-0.4)   10/02/20  05:42    


 


Neonat Total Bilirubin  Not Reportable   10/02/20  05:42    


 


Neonat Direct Bilirubin  Not Reportable   10/02/20  05:42    


 


Neonat Indirect Bili  Not Reportable   10/02/20  05:42    


 


AST  23 U/L (17-59)   10/02/20  05:42    


 


ALT  17 U/L (<50)   10/02/20  05:42    


 


Alkaline Phosphatase  100 U/L ()   10/02/20  05:42    


 


Ammonia  < 8.7 umol/L (9-33)  L  09/26/20  18:25    


 


Creatine Kinase  82 U/L ()   09/27/20  06:51    


 


CK-MB (CK-2)  1.39 ng/mL (<4.55)   09/27/20  06:51    


 


Troponin I  < 0.012 ng/mL  09/27/20  06:51    


 


NT-Pro-B Natriuret Pep  223 pg/mL (<125)  H  09/26/20  18:25    


 


Total Protein  7.3 g/dL (6.3-8.2)   10/02/20  05:42    


 


Albumin  3.8 g/dL (3.5-5.0)   10/02/20  05:42    


 


Triglycerides  72 mg/dL (<150)   09/27/20  06:51    


 


Cholesterol  215.75 mg/dL (0-200)  H  09/27/20  06:51    


 


LDL Cholesterol Direct  87 mg/dL (<100)   09/27/20  06:51    


 


VLDL Cholesterol  14.0 mg/dL (10-31)   09/27/20  06:51    


 


HDL Cholesterol  115 mg/dL (>40)   09/27/20  06:51    


 


Amylase  104 U/L ()   09/26/20  07:49    


 


Lipase  117.6 U/L ()   09/26/20  07:49    


 


EGFR   Cancelled   09/28/20  08:35    


 


TSH  0.48 uIU/mL (0.47-4.68)   09/26/20  07:49    


 


Free T4  1.39 ng/dL (0.78-2.19)   09/26/20  07:49    


 


Urine Color  YELLOW   09/26/20  09:33    


 


Urine Appearance  CLEAR   09/26/20  09:33    


 


Urine pH  5.0  (5.0-9.0)   09/26/20  09:33    


 


Ur Specific Gravity  1.013   09/26/20  09:33    


 


Urine Protein  100 mg/dL (NEGATIVE)  H  09/26/20  09:33    


 


Urine Glucose (UA)  NEGATIVE mg/dL (NEGATIVE)   09/26/20  09:33    


 


Urine Ketones  NEGATIVE mg/dL (NEGATIVE)   09/26/20  09:33    


 


Urine Blood  MODERATE  (NEGATIVE)  H  09/26/20  09:33    


 


Urine Nitrite  NEGATIVE  (NEGATIVE)   09/26/20  09:33    


 


Urine Bilirubin  NEGATIVE  (NEGATIVE)   09/26/20  09:33    


 


Urine Urobilinogen  NEGATIVE mg/dL (<2.0)   09/26/20  09:33    


 


Ur Leukocyte Esterase  NEGATIVE  (NEGATIVE)   09/26/20  09:33    


 


Urine WBC (Auto)  1 /HPF  09/26/20  09:33    


 


Urine RBC (Auto)  9 /HPF  09/26/20  09:33    


 


Urine Bacteria (Auto)  TRACE /HPF  09/26/20  09:33    


 


Squamous Epi Cells Auto  <1 /HPF  09/26/20  09:33    


 


Urine Mucus (Auto)  RARE /LPF  09/26/20  09:33    


 


Urine Ascorbic Acid  40  (NEGATIVE)  H  09/26/20  09:33    


 


Urine Opiates Screen  NEGATIVE   09/26/20  09:33    


 


Urine Methadone Screen  NEGATIVE   09/26/20  09:33    


 


Ur Barbiturates Screen  UNCONFIRMED POSITIVE   09/26/20  09:33    


 


Phenytoin  16.1 ug/mL (10.0-20.0)   09/26/20  09:33    


 


Ur Phencyclidine Scrn  NEGATIVE   09/26/20  09:33    


 


Ur Amphetamines Screen  NEGATIVE   09/26/20  09:33    


 


U Benzodiazepines Scrn  NEGATIVE   09/26/20  09:33    


 


Urine Cocaine Screen  NEGATIVE   09/26/20  09:33    


 


U Marijuana (THC) Screen  NEGATIVE   09/26/20  09:33    


 


COVID-19 Source  See comment   09/26/20  14:29    


 


COVID-19 (PRISCILLA)  Not Detected  (Not Detect)   09/26/20  14:29    








                                        











  09/26/20 09/26/20 09/26/20





  07:49 18:25 18:25


 


CK-MB (CK-2)    1.58


 


Troponin I  < 0.012   < 0.012


 


NT-Pro-B Natriuret Pep  166 H  223 H 














  09/27/20 09/27/20





  00:45 06:51


 


CK-MB (CK-2)  1.53  1.39


 


Troponin I  < 0.012  < 0.012


 


NT-Pro-B Natriuret Pep  











Impressions: 


                                        





Chest CT  09/26/20 00:00


IMPRESSION:  No acute findings. Small right pleural effusion and basilar 

subsegmental atelectasis -scarring, chronic.


 








Chest X-Ray  09/26/20 07:36


IMPRESSION:  NO ACUTE RADIOGRAPHIC FINDING IN THE CHEST.


 














Plan


Health Concerns: 


Exacerbation of his COPD. 


Plan of Treatment: 


Maintain on tapering dose Prednisone therapy along with Anoro and use of 

Combivent Respimat for acute wheezing management. We will consider triple 

therapy inhaler if necessary on outpatient evaluation.


Goals: 


Reduce readmission risk and frequency of exacerbation.


Time Spent: Greater than 30 Minutes - I had extensive discussion with patient 

regarding post acute care management plan.





Stroke


Is this a Stroke Patient?: No





Acute Heart Failure


Is this a Heart Failure Patient?: No

## 2020-10-28 ENCOUNTER — HOSPITAL ENCOUNTER (OUTPATIENT)
Dept: HOSPITAL 62 - SP | Age: 70
End: 2020-10-28
Attending: INTERNAL MEDICINE
Payer: MEDICARE

## 2020-10-28 DIAGNOSIS — I25.9: ICD-10-CM

## 2020-10-28 DIAGNOSIS — R06.00: ICD-10-CM

## 2020-10-28 DIAGNOSIS — I21.4: ICD-10-CM

## 2020-10-28 DIAGNOSIS — I10: Primary | ICD-10-CM

## 2020-10-28 DIAGNOSIS — J44.9: ICD-10-CM

## 2020-10-28 PROCEDURE — 93306 TTE W/DOPPLER COMPLETE: CPT

## 2020-10-28 NOTE — XCELERA REPORT
[address] [address]

                               Tel: [phone]

                               Fax: [phone]



                      Transthoracic Echocardiogram Report

_______________________________________________________________________________



Name: ANGY GILBERT

MRN: [id number]                                Age: 69 yrs

Gender: Male                                   : 1950

Patient Status: Preadmit                       Patient Location: [facility]

Account #: [id number]

Study Date: 10/28/2020 12:08 PM

History: NSTEMI

COPD

HTN

Accession #: [id number]

_______________________________________________________________________________



Height: 71 in        Weight: 200 lb        BSA: 2.1 m2

_______________________________________________________________________________

Procedure: A complete two-dimensional transthoracic echocardiogram was

performed (2D, M-mode, spectral and color flow Doppler). The study was

technically difficult with many images being suboptimal in quality.

Reason For Study: NSTEMI

Previous Evaluation: No previous studies were available.

History: NSTEMI

COPD

HTN. HTN.



Ordering Physician: YUSUF CRAFT

Performed By: Unique Jimenez

_______________________________________________________________________________



Interpretation Summary

Left ventricular systolic function is normal.

The Ejection Fraction estimate is 55-60%

The right ventricle is normal in size and function.

There is a trace amount of mitral regurgitation

There is no aortic valve stenosis

There is a trace amount of tricuspid regurgitation

There is no pericardial effusion.



MMode/2D Measurements & Calculations



RVDd: 2.3 cm   LVIDd: 4.3 cm   FS: 28.4 %             Ao root diam: 2.3 cm

IVSd: 0.72 cm  LVIDs: 3.1 cm   EDV(Teich): 83.5 ml    Ao root area: 4.3 cm2

               LVPWd: 0.91 cm  ESV(Teich): 37.4 ml

                               EF(Teich): 55.2 %



Doppler Measurements & Calculations

MV E max marlee:      MV dec slope:         Ao V2 max:        LV V1 max P.9 cm/sec        348.1 cm/sec2         140.8 cm/sec      6.7 mmHg

MV A max marlee:      MV dec time: 0.24 sec Ao max PG:        LV V1 max:

77.4 cm/sec                              7.9 mmHg          129.8 cm/sec

MV E/A: 1.1

        _______________________________________________________________

PA V2 max:         PI end-d marlee:

82.2 cm/sec        77.0 cm/sec

PA max P.7 mmHg





Left Ventricle

The left ventricle is normal in size. There is normal left ventricular wall

thickness. Left ventricular systolic function is normal. The Ejection Fraction

estimate is 55-60%. LV diastolic function could not be adequately assessed.

Regional wall motion abnormalities cannot be excluded due to limited

visualization.



Right Ventricle

The right ventricle is normal in size and function.



Atria

The right atrium is normal. The left atrial size is normal. The interatrial

septum is intact with no evidence for an atrial septal defect. There is no

Doppler evidence for an interatrial shunt.



Mitral Valve

The mitral valve is grossly normal. There is no mitral valve stenosis. There

is a trace amount of mitral regurgitation.





Aortic Valve

The aortic valve opens well. The aortic valve is trileaflet. The aortic valve

is normal in structure and function. There is no aortic valve stenosis. No

aortic regurgitation is present.



Tricuspid Valve

The tricuspid valve is not well visualized, but is grossly normal. There is no

tricuspid stenosis. There is a trace amount of tricuspid regurgitation.

Tricuspid regurgitation jet envelope not well defined to measure RV systolic

pressure accurately.



Pulmonic Valve

The pulmonic valve is not well seen, but is grossly normal. There is no

pulmonic valvular stenosis. There is a trace amount of pulmonic regurgitation.



Great Vessels

The aortic root is normal size. The inferior vena cava appeared normal and

decreased > 50% with respiration (RAP 5-10 mmHg).





Effusions

There is no pericardial effusion.



_______________________________________________________________________________



_______________________________________________________________________________

Electronically signed by:      Yusuf Craft      on 10/28/2020 10:07 PM



CC: YUSUF CRAFT Anil

## 2020-10-30 NOTE — RADIOLOGY REPORT (SQ)
----- Message from Madelyn Enrique sent at 10/30/2020  1:10 PM CDT -----  Type: Needs Medical Advice    Who Called:  Patient  Best Call Back Number: [phone]  Additional Information:  Patient requesting to speak with nurse concerning needing doctor's excuses from first day seen by doctor/ he needs for workers comp/please call patient back to advise.     EXAM DESCRIPTION:  CHEST SINGLE VIEW



IMAGES COMPLETED DATE/TIME:  9/11/2020 1:10 pm



REASON FOR STUDY:  sob



COMPARISON:  9/27/2018



EXAM PARAMETERS:  NUMBER OF VIEWS: One view.

TECHNIQUE: Single frontal radiographic view of the chest acquired.

RADIATION DOSE: NA

LIMITATIONS: None.



FINDINGS:  LUNGS AND PLEURA: No opacities, masses or pneumothorax. No pleural effusion.

MEDIASTINUM AND HILAR STRUCTURES: No masses.  Contour normal.

HEART AND VASCULAR STRUCTURES: Heart normal in size.  Normal vasculature.

BONES: No acute findings.

HARDWARE: None in the chest.

OTHER: No other significant finding.



IMPRESSION:  NO ACUTE RADIOGRAPHIC FINDING IN THE CHEST.



TECHNICAL DOCUMENTATION:  JOB ID:  [id number]

 2011 [employer]- All Rights Reserved



Reading location - IP/workstation name: [email or web address]

## 2020-11-02 ENCOUNTER — HOSPITAL ENCOUNTER (OUTPATIENT)
Dept: HOSPITAL 62 - RAD | Age: 70
End: 2020-11-02
Attending: INTERNAL MEDICINE
Payer: MEDICARE

## 2020-11-02 DIAGNOSIS — E78.5: ICD-10-CM

## 2020-11-02 DIAGNOSIS — I21.4: ICD-10-CM

## 2020-11-02 DIAGNOSIS — F17.211: ICD-10-CM

## 2020-11-02 DIAGNOSIS — G47.39: ICD-10-CM

## 2020-11-02 DIAGNOSIS — I25.9: ICD-10-CM

## 2020-11-02 DIAGNOSIS — G40.802: ICD-10-CM

## 2020-11-02 DIAGNOSIS — I10: Primary | ICD-10-CM

## 2020-11-02 DIAGNOSIS — J44.9: ICD-10-CM

## 2020-11-02 DIAGNOSIS — Z86.73: ICD-10-CM

## 2020-11-02 PROCEDURE — 93017 CV STRESS TEST TRACING ONLY: CPT

## 2020-11-02 PROCEDURE — A9500 TC99M SESTAMIBI: HCPCS

## 2020-11-02 PROCEDURE — 78452 HT MUSCLE IMAGE SPECT MULT: CPT

## 2020-11-02 NOTE — DRAGON STRESS TEST REPORT
Pharmacological nuclear stress test



Date: November 2, 2020

Referring physician: Self

Performing physician: Dewey Craft MD

Indication: Non-STEMI



Clinical history

69-year-old male with medical history significant for systemic hypertension, 
dyslipidemia, COPD with presented to the emergency room with cough and wheezing.
 This was likely a COPD exacerbation but he did have elevated troponin with a 
non-STEMI.  We decided to proceed with pharmacological nuclear stress testing to
evaluate for myocardial ischemia.



Procedure

The patient presented to the stress lab.  Initially rest images were obtained 
according to standard protocol after the injection of of 14.86 millicurie 
technetium 99m sestamibi.  Subsequently the patient underwent pharmacological 
stress utilizing 0.4 mg of regadenoson intravenously.  The patient's EKG and 
vital signs were monitored throughout the procedure.  Subsequently patient was 
injected with 38 millicuries of technetium 99m sestamibi.  After a period of 
rest, stress images were obtained according to standard protocol.



EKG showed sinus bradycardia at 56 beats per minute.  The patient's stress EKG 
did not show any evidence for myocardial ischemia.  There were no arrhythmias 
observed.



Raw as well as processed rest and stress images were reviewed.  There was mild 
to moderate gut uptake which did not interfere with the study.  The rest and 
stress images show uniform uptake of radioactive isotope without any fixed or 
reversible defects to suggest myocardial ischemia or myocardial infarction.  
There is evidence of diaphragmatic attenuation on the study. There is normal 
contractility post-rest.  The calculated ejection fraction is 67 %.  The TID 
ratio is 1.10.



Conclusion

The stress EKG is negative for myocardial ischemia  

There is no scintigraphic evidence of myocardial infarction or ischemia provoked
by pharmacological stress. 

There is normal contractility post-stress.

The gated left ventricular ejection fraction is 67%.

The patient will be given an appointment to discuss these results.







[facility]

## 2024-03-14 NOTE — PDOC PROGRESS REPORT
Subjective


Progress Note for:: 09/24/18


Subjective:: 





Coughing with minimal sputum production. Breathing is getting better. No chest 

pain. No abdominal pain, nausea, or vomiting. No fever or chills. No seizure 

activity since admission.


Reason For Visit: 


SEVERE BILATERAL PNEUMONIA,H/O SEIZURE,DILANTIN








Physical Exam


Vital Signs: 


 











Temp Pulse Resp BP Pulse Ox


 


 98.1 F   66   16   128/59 H  96 


 


 09/24/18 03:11  09/24/18 11:28  09/24/18 11:28  09/24/18 03:11  09/24/18 11:28








 Intake & Output











 09/23/18 09/24/18 09/25/18





 06:59 06:59 06:59


 


Intake Total  1350 


 


Balance  1350 


 


Weight  86.2 kg 











General appearance: PRESENT: no acute distress - Remain on supplememntal oxygen 

at 2L/min, well-developed, well-nourished


Head exam: PRESENT: atraumatic, normocephalic


Eye exam: PRESENT: conjunctiva pink, EOMI, PERRLA.  ABSENT: scleral icterus


Ear exam: PRESENT: normal external ear exam


Mouth exam: PRESENT: moist, tongue midline


Respiratory exam: PRESENT: crackles - right lung field improved with manual 

percussion manuver., decreased breath sounds


Cardiovascular exam: PRESENT: RRR.  ABSENT: diastolic murmur, rubs, systolic 

murmur


Vascular exam: PRESENT: normal capillary refill.  ABSENT: pallor


GI/Abdominal exam: PRESENT: normal bowel sounds, soft.  ABSENT: distended, 

guarding, mass, organolmegaly, rebound, tenderness


Extremities exam: ABSENT: pedal edema


Musculoskeletal exam: PRESENT: normal inspection


Neurological exam: PRESENT: alert, awake, oriented to person, oriented to place

, oriented to time, oriented to situation, CN II-XII grossly intact.  ABSENT: 

motor sensory deficit


Skin exam: PRESENT: dry, intact, warm.  ABSENT: cyanosis, rash





Results


Laboratory Results: 


 





 09/24/18 06:00 





 09/24/18 06:00 





 











  09/23/18 09/24/18 09/24/18





  16:04 06:00 06:00


 


WBC   12.0 H 


 


RBC   4.23 L 


 


Hgb   12.0 L 


 


Hct   37.2 L 


 


MCV   88 


 


MCH   28.3 


 


MCHC   32.2 


 


RDW   14.3 H 


 


Plt Count   204 


 


Seg Neutrophils %   84.3 H 


 


Lymphocytes %   10.1 L 


 


Monocytes %   5.3 


 


Eosinophils %   0.1 


 


Basophils %   0.2 


 


Absolute Neutrophils   10.1 H 


 


Absolute Lymphocytes   1.2 


 


Absolute Monocytes   0.6 


 


Absolute Eosinophils   0.0 


 


Absolute Basophils   0.0 


 


Carbonic Acid  1.04 L  


 


HCO3/H2CO3 Ratio  23:1  


 


ABG pH  7.46 H  


 


ABG pCO2  34.7 L  


 


ABG pO2  69.4 L  


 


ABG HCO3  24.0  


 


ABG O2 Saturation  94.9  


 


ABG Base Excess  0.5  


 


FiO2  2L  


 


Sodium    140.1


 


Potassium    4.3


 


Chloride    103


 


Carbon Dioxide    25


 


Anion Gap    12


 


BUN    12


 


Creatinine    1.12


 


Est GFR ( Amer)    > 60


 


Est GFR (Non-Af Amer)    > 60


 


Glucose    119 H


 


Calcium    9.0


 


Total Bilirubin    0.8


 


AST    16 L


 


ALT    7 L


 


Alkaline Phosphatase    118


 


Total Protein    7.5


 


Albumin    3.8








 











  09/23/18 09/23/18





  16:20 16:20


 


Creatine Kinase  93 


 


CK-MB (CK-2)   0.62


 


Troponin I   0.066


 


NT-Pro-B Natriuret Pep   1040 H











Impressions: 


 





Chest CT  09/23/18 00:00


IMPRESSION:  Patchy bilateral airspace disease worrisome pneumonia.


 








Chest X-Ray  09/23/18 06:53


IMPRESSION:  RIGHT LOWER LOBE AIRSPACE DISEASE CONCERNING FOR PNEUMONIA.


 














Assessment & Plan





- Diagnosis


(1) Altered mental status


Qualifiers: 


   Altered mental status type: unspecified   Qualified Code(s): R41.82 - 

Altered mental status, unspecified   


Is this a current diagnosis for this admission?: Yes   


Plan: 


See attending physician orders. Improved mental status presently.








(2) Acute hypoxemic respiratory failure


Is this a current diagnosis for this admission?: Yes   


Plan: 


Improved on current supplemental oxygen.








(3) Aspiration pneumonia


Qualifiers: 


   Aspiration pneumonia type: unspecified   Laterality: bilateral   Lung 

location: unspecified part of lung   Qualified Code(s): J69.0 - Pneumonitis due 

to inhalation of food and vomit   


Is this a current diagnosis for this admission?: Yes   


Plan: 


Continue current antibiotic therapy. Follow up on CBC indices. Instructed on 

use of bedside Flutter and incentive spirometer for pulmonary rehabilitation 

and toileting.








(4) Dilantin toxicity


Qualifiers: 


   Encounter type: initial encounter   Injury intent: undetermined intent   

Qualified Code(s): T42.0X4A - Poisoning by hydantoin derivatives, undetermined, 

initial encounter   


Is this a current diagnosis for this admission?: Yes   


Plan: 


See attending physician orders.








(5) Epilepsy


Qualifiers: 


   Epilepsy type: unspecified   Intractability: not intractable   Status 

epilepticus: without status epilepticus   Qualified Code(s): G40.909 - Epilepsy

, unspecified, not intractable, without status epilepticus   


Is this a current diagnosis for this admission?: Yes   


Plan: 


Continue current medication management.








- Time


Time Spent with patient: 25-34 minutes


Medications reviewed and adjusted accordingly: Yes


Anticipated discharge: Home


Within: Other





- Inpatient Certification


Based on my medical assessment, after consideration of the patient's 

comorbidities, presenting symptoms, or acuity I expect that the services needed 

warrant INPATIENT care.: Yes


I certify that my determination is in accordance with my understanding of 

Medicare's requirements for reasonable and necessary INPATIENT services [42 CFR 

412.3e].: Yes


Medical Necessity: Need Close Monitoring Due to Risk of Patient Decompensation, 

Need For IV Fluids, Need For Continuous Telemetry Monitoring, Need for IV 

Antibiotics, Risk of Complication if Not Cared For in Hospital


Post Hospital Care: D/C Planner Documentation





- Plan Summary


Plan Summary: 





See attending physician orders. Follow up on culture results. Detail Level: Detailed